# Patient Record
Sex: FEMALE | Race: WHITE | Employment: UNEMPLOYED | URBAN - METROPOLITAN AREA
[De-identification: names, ages, dates, MRNs, and addresses within clinical notes are randomized per-mention and may not be internally consistent; named-entity substitution may affect disease eponyms.]

---

## 2020-08-03 PROBLEM — M79.601 RIGHT ARM PAIN: Status: ACTIVE | Noted: 2019-11-11

## 2020-08-03 PROBLEM — M54.6 CHRONIC THORACIC BACK PAIN: Status: ACTIVE | Noted: 2017-03-23

## 2020-08-03 PROBLEM — G47.33 OSA (OBSTRUCTIVE SLEEP APNEA): Status: ACTIVE | Noted: 2018-01-26

## 2020-08-03 PROBLEM — G89.29 CHRONIC THORACIC BACK PAIN: Status: ACTIVE | Noted: 2017-03-23

## 2020-08-03 RX ORDER — LISINOPRIL 20 MG/1
30 TABLET ORAL DAILY
COMMUNITY
Start: 2020-07-27 | End: 2021-09-08

## 2020-08-04 ENCOUNTER — OFFICE VISIT (OUTPATIENT)
Dept: UROGYNECOLOGY | Age: 85
End: 2020-08-04
Payer: MEDICARE

## 2020-08-04 VITALS
TEMPERATURE: 97.1 F | RESPIRATION RATE: 16 BRPM | DIASTOLIC BLOOD PRESSURE: 67 MMHG | HEART RATE: 65 BPM | SYSTOLIC BLOOD PRESSURE: 155 MMHG | OXYGEN SATURATION: 97 %

## 2020-08-04 PROCEDURE — 1090F PRES/ABSN URINE INCON ASSESS: CPT | Performed by: OBSTETRICS & GYNECOLOGY

## 2020-08-04 PROCEDURE — 1123F ACP DISCUSS/DSCN MKR DOCD: CPT | Performed by: OBSTETRICS & GYNECOLOGY

## 2020-08-04 PROCEDURE — 57150 TREAT VAGINA INFECTION: CPT | Performed by: OBSTETRICS & GYNECOLOGY

## 2020-08-04 PROCEDURE — G8428 CUR MEDS NOT DOCUMENT: HCPCS | Performed by: OBSTETRICS & GYNECOLOGY

## 2020-08-04 PROCEDURE — 4004F PT TOBACCO SCREEN RCVD TLK: CPT | Performed by: OBSTETRICS & GYNECOLOGY

## 2020-08-04 PROCEDURE — 4040F PNEUMOC VAC/ADMIN/RCVD: CPT | Performed by: OBSTETRICS & GYNECOLOGY

## 2020-08-04 PROCEDURE — 99214 OFFICE O/P EST MOD 30 MIN: CPT | Performed by: OBSTETRICS & GYNECOLOGY

## 2020-08-04 PROCEDURE — G8421 BMI NOT CALCULATED: HCPCS | Performed by: OBSTETRICS & GYNECOLOGY

## 2020-08-04 PROCEDURE — G8400 PT W/DXA NO RESULTS DOC: HCPCS | Performed by: OBSTETRICS & GYNECOLOGY

## 2020-08-04 RX ORDER — ESTRADIOL 0.1 MG/G
CREAM VAGINAL
COMMUNITY
Start: 2019-07-30

## 2020-08-04 ASSESSMENT — ENCOUNTER SYMPTOMS: BACK PAIN: 1

## 2020-08-04 NOTE — PROGRESS NOTES
Date: 8/4/2020     HPI:     Name: Emily Sweet  YOB: 1935    CC: Emily Sweet returns for a pessary check today. HPI: She reports no bleeding, has discharge, no pain, no discomfot. She is not removing the device herself at home. Do you have vaginal discharge?: Yes  How long have you had this problem?:   Several years  Please rate the severity of your problem: moderately severe  Anything make it better? nothing   Ob/Gyn History:    OB History   No obstetric history on file. Past Medical History:   Past Medical History:   Diagnosis Date    Arthritis of both knees     [facility] ([facility])     Cellulitis     Chest pain     Chronic midline thoracic back pain     Compression fracture of thoracic vertebra, closed, initial encounter ([facility])     Heart palpitations     HTN (hypertension)     Hyperglyceridemia     Hyperlipidemia     Left cataract     Leukorrhea     Macular degeneration     Midline cystocele     Midline low back pain without sciatica     MICHI (obstructive sleep apnea)     Osteoporosis     Raynaud phenomenon     Snoring     Subacute on chronic vaginitis     Vaginal atrophy     Vaginal discharge     Vulvovaginitis      Past Surgical History:   Past Surgical History:   Procedure Laterality Date    APPENDECTOMY      BUNIONECTOMY      EYE SURGERY      TONSILLECTOMY      VARICOSE VEIN SURGERY      WISDOM TOOTH EXTRACTION       Current Medications:  Current Outpatient Medications   Medication Sig Dispense Refill    estradiol (ESTRACE) 0.1 MG/GM vaginal cream Apply small amount to vagina every other night with finger      lisinopril (PRINIVIL;ZESTRIL) 20 MG tablet Take 30 mg by mouth daily       No current facility-administered medications for this visit. Allergies:    Allergies   Allergen Reactions    Penicillins Other (See Comments)     Social History:   Social History     Socioeconomic History    Marital status:      Spouse name: Not on file Pulmonary:      Effort: Pulmonary effort is normal.   Abdominal:      General: Abdomen is flat. Palpations: Abdomen is soft. Genitourinary:     Exam position: Lithotomy position. Comments: Vaginal discharge  Skin:     General: Skin is warm and dry. Neurological:      Mental Status: She is alert and oriented to person, place, and time. Psychiatric:         Mood and Affect: Mood normal.       External genitalia: normal, no lesions  Vulva: no lesions  Urethra: no caruncle    The ring with support pessary was removed and cleaned. The entire length of the vagina including the vaginal walls were irrigated with H2O2 coated swabs. Patient tolerated procedure well. The pessary was replaced. Assessment/Plan:     Abril Ryder is a 80 y.o. female with   1. Vaginal discharge    2. Vaginal atrophy    3. Cystocele, midline      The patient is to follow up in 3 months for evaluation. She was counseled on symptoms associated with ulceration or malposition of the device. She was asked to call or return sooner for abnormal discharge, bleeding, spotting, or other concerns. Orders Placed This Encounter   Procedures    IN TREAT VAGINA INFECTION     No orders of the defined types were placed in this encounter. RAYMOND Ventura Going am scribing for and in the presence and direction of Dr. Kaleb Bethea. 8/4/2020   Angela Quinonez LPN  I, Dr. Kaleb Bethea, personally performed the services described in this documentation as scribed by the clinical staff in my presence, and it is both accurate and complete.

## 2020-12-01 ENCOUNTER — OFFICE VISIT (OUTPATIENT)
Dept: UROGYNECOLOGY | Age: 85
End: 2020-12-01
Payer: MEDICARE

## 2020-12-01 VITALS
SYSTOLIC BLOOD PRESSURE: 158 MMHG | HEART RATE: 55 BPM | OXYGEN SATURATION: 98 % | RESPIRATION RATE: 16 BRPM | TEMPERATURE: 97 F | DIASTOLIC BLOOD PRESSURE: 67 MMHG

## 2020-12-01 PROCEDURE — G8421 BMI NOT CALCULATED: HCPCS | Performed by: OBSTETRICS & GYNECOLOGY

## 2020-12-01 PROCEDURE — 1123F ACP DISCUSS/DSCN MKR DOCD: CPT | Performed by: OBSTETRICS & GYNECOLOGY

## 2020-12-01 PROCEDURE — 99214 OFFICE O/P EST MOD 30 MIN: CPT | Performed by: OBSTETRICS & GYNECOLOGY

## 2020-12-01 PROCEDURE — 57150 TREAT VAGINA INFECTION: CPT | Performed by: OBSTETRICS & GYNECOLOGY

## 2020-12-01 PROCEDURE — G8484 FLU IMMUNIZE NO ADMIN: HCPCS | Performed by: OBSTETRICS & GYNECOLOGY

## 2020-12-01 PROCEDURE — G8400 PT W/DXA NO RESULTS DOC: HCPCS | Performed by: OBSTETRICS & GYNECOLOGY

## 2020-12-01 PROCEDURE — 4040F PNEUMOC VAC/ADMIN/RCVD: CPT | Performed by: OBSTETRICS & GYNECOLOGY

## 2020-12-01 PROCEDURE — G8427 DOCREV CUR MEDS BY ELIG CLIN: HCPCS | Performed by: OBSTETRICS & GYNECOLOGY

## 2020-12-01 PROCEDURE — 1036F TOBACCO NON-USER: CPT | Performed by: OBSTETRICS & GYNECOLOGY

## 2020-12-01 PROCEDURE — 1090F PRES/ABSN URINE INCON ASSESS: CPT | Performed by: OBSTETRICS & GYNECOLOGY

## 2020-12-01 RX ORDER — ATORVASTATIN CALCIUM 40 MG/1
TABLET, FILM COATED ORAL
COMMUNITY
Start: 2020-09-23

## 2020-12-01 ASSESSMENT — ENCOUNTER SYMPTOMS: APNEA: 1

## 2020-12-01 NOTE — PROGRESS NOTES
Date: 12/1/2020     HPI:     Name: Katherine Oconnor  YOB: 1935    CC: Katherine Oconnor returns for a pessary check today. HPI: She reports no bleeding, has discharge, no pain, no discomfot. She is not removing the device herself at home. Do you have vaginal discharge?: Yes  How long have you had this problem?:   Since having pessary  Please rate the severity of your problem: very severe  Anything make it better? nothing     Ob/Gyn History:    OB History   No obstetric history on file. Past Medical History:   Past Medical History:   Diagnosis Date    Arthritis of both knees     Atrial tachycardia ([facility])     Cellulitis     Chest pain     Chronic midline thoracic back pain     Compression fracture of thoracic vertebra, closed, initial encounter ([facility])     Heart palpitations     HTN (hypertension)     Hyperglyceridemia     Hyperlipidemia     Left cataract     Leukorrhea     Macular degeneration     Midline cystocele     Midline low back pain without sciatica     MICHI (obstructive sleep apnea)     Osteoporosis     Raynaud phenomenon     Snoring     Subacute on chronic vaginitis     Vaginal atrophy     Vaginal discharge     Vulvovaginitis      Past Surgical History:   Past Surgical History:   Procedure Laterality Date    APPENDECTOMY      BUNIONECTOMY      EYE SURGERY      TONSILLECTOMY      VARICOSE VEIN SURGERY      WISDOM TOOTH EXTRACTION       Current Medications:  Current Outpatient Medications   Medication Sig Dispense Refill    atorvastatin (LIPITOR) 40 MG tablet       estradiol (ESTRACE) 0.1 MG/GM vaginal cream Apply small amount to vagina every other night with finger      lisinopril (PRINIVIL;ZESTRIL) 20 MG tablet Take 30 mg by mouth daily       No current facility-administered medications for this visit. Allergies:    Allergies   Allergen Reactions    Penicillins Other (See Comments)     Social History:   Social History     Socioeconomic History    Marital status:      Spouse name: Not on file    Number of children: Not on file    Years of education: Not on file    Highest education level: Not on file   Occupational History    Not on file   Social Needs    Financial resource strain: Not on file    Food insecurity     Worry: Not on file     Inability: Not on file    Transportation needs     Medical: Not on file     Non-medical: Not on file   Tobacco Use    Smoking status: Former Smoker    Smokeless tobacco: Never Used   Substance and Sexual Activity    Alcohol use: Yes     Comment: socially    Drug use: Never    Sexual activity: Not Currently     Comment:    Lifestyle    Physical activity     Days per week: Not on file     Minutes per session: Not on file    Stress: Not on file   Relationships    Social connections     Talks on phone: Not on file     Gets together: Not on file     Attends Mandaeism service: Not on file     Active member of club or organization: Not on file     Attends meetings of clubs or organizations: Not on file     Relationship status: Not on file    Intimate partner violence     Fear of current or ex partner: Not on file     Emotionally abused: Not on file     Physically abused: Not on file     Forced sexual activity: Not on file   Other Topics Concern    Not on file   Social History Narrative    Not on file     Family History:   Family History   Problem Relation Age of Onset    Cancer Mother     Heart Attack Father     Heart Failure Father     Parkinsonism Sister     Cancer Maternal Grandmother         gastric    Mult Sclerosis Maternal Grandfather     Stroke Paternal Grandfather     Hypertension Son      Review of System  Review of Systems   Respiratory: Positive for apnea. Genitourinary: Positive for vaginal bleeding. All other systems reviewed and are negative. A review of systems was done by the patient and reviewed by me and scanned into media today.     Objective:     Vital Signs  Vitals:    12/01/20 0959   BP: (!) 158/67   Pulse: 55   Resp: 16   Temp: 97 °F (36.1 °C)   SpO2: 98%     Physical Exam  External genitalia: normal, no lesions  Vulva: no lesions  Urethra: no caruncle    The ring with support pessary was removed and cleaned. The entire length of the vagina including the vaginal walls were irrigated with H2O2 coated swabs. Patient tolerated procedure well. The pessary was replaced. No results found for this visit on 12/01/20. Assessment/Plan:     Arleen García is a 80 y.o. female with   1. Vaginal discharge    2. Vaginal atrophy    3. Cystocele, midline      The patient is to follow up in 3 months for evaluation. She was counseled on symptoms associated with ulceration or malposition of the device. She was asked to call or return sooner for abnormal discharge, bleeding, spotting, or other concerns. Orders Placed This Encounter   Procedures    NE TREAT VAGINA INFECTION     No orders of the defined types were placed in this encounter.        Sveta White

## 2021-03-02 ENCOUNTER — OFFICE VISIT (OUTPATIENT)
Dept: UROGYNECOLOGY | Age: 86
End: 2021-03-02
Payer: MEDICARE

## 2021-03-02 VITALS
DIASTOLIC BLOOD PRESSURE: 79 MMHG | RESPIRATION RATE: 20 BRPM | SYSTOLIC BLOOD PRESSURE: 176 MMHG | OXYGEN SATURATION: 97 % | TEMPERATURE: 96.8 F | HEART RATE: 62 BPM

## 2021-03-02 DIAGNOSIS — N81.11 CYSTOCELE, MIDLINE: ICD-10-CM

## 2021-03-02 DIAGNOSIS — N89.8 VAGINAL DISCHARGE: Primary | ICD-10-CM

## 2021-03-02 DIAGNOSIS — N95.2 VAGINAL ATROPHY: ICD-10-CM

## 2021-03-02 PROCEDURE — 4040F PNEUMOC VAC/ADMIN/RCVD: CPT | Performed by: OBSTETRICS & GYNECOLOGY

## 2021-03-02 PROCEDURE — 99214 OFFICE O/P EST MOD 30 MIN: CPT | Performed by: OBSTETRICS & GYNECOLOGY

## 2021-03-02 PROCEDURE — 1123F ACP DISCUSS/DSCN MKR DOCD: CPT | Performed by: OBSTETRICS & GYNECOLOGY

## 2021-03-02 PROCEDURE — G8400 PT W/DXA NO RESULTS DOC: HCPCS | Performed by: OBSTETRICS & GYNECOLOGY

## 2021-03-02 PROCEDURE — G8484 FLU IMMUNIZE NO ADMIN: HCPCS | Performed by: OBSTETRICS & GYNECOLOGY

## 2021-03-02 PROCEDURE — 1090F PRES/ABSN URINE INCON ASSESS: CPT | Performed by: OBSTETRICS & GYNECOLOGY

## 2021-03-02 PROCEDURE — G8427 DOCREV CUR MEDS BY ELIG CLIN: HCPCS | Performed by: OBSTETRICS & GYNECOLOGY

## 2021-03-02 PROCEDURE — 1036F TOBACCO NON-USER: CPT | Performed by: OBSTETRICS & GYNECOLOGY

## 2021-03-02 PROCEDURE — 57150 TREAT VAGINA INFECTION: CPT | Performed by: OBSTETRICS & GYNECOLOGY

## 2021-03-02 PROCEDURE — G8421 BMI NOT CALCULATED: HCPCS | Performed by: OBSTETRICS & GYNECOLOGY

## 2021-03-02 RX ORDER — EZETIMIBE AND SIMVASTATIN 10; 40 MG/1; MG/1
1 TABLET ORAL NIGHTLY
COMMUNITY

## 2021-03-02 RX ORDER — AMOXICILLIN 250 MG
1 CAPSULE ORAL NIGHTLY
COMMUNITY
Start: 2020-05-12 | End: 2021-06-08

## 2021-03-02 NOTE — LETTER
[address] [place] [address] Urogynecology  [facility] [address] [facility]  Phone: [phone]  Fax: [phone]    Mary Harley MD        March 2, 2021        Dear Dr. Ambar Jones,    I had the pleasure of seeing Christa Brandon in the office today. She is done very well with her pessary. Thank you for allowing me to participate in her care if I can be of any further assistance please not hesitate to let me know      Sincerely,    Moises Gallego MD   Date: 3/2/2021     HPI:     Name: Isaias Walker  YOB: 1935    CC: Isaias Walker returns for a pessary check today. HPI: She reports no bleeding, has discharge, no pain, no discomfot. She is not removing the device herself at home. Do you have vaginal discharge?: Yes  How long have you had this problem?:   Since pessary placement  Please rate the severity of your problem: mild  Anything make it better? Cleaning the pessary     Ob/Gyn History:    OB History   No obstetric history on file.       Past Medical History:   Past Medical History:   Diagnosis Date    Arthritis of both knees     Atrial tachycardia ([facility])     Cellulitis     Chest pain     Chronic midline thoracic back pain     Compression fracture [facility] thoracic vertebra[facility] closed[facility])     Heart palpitations     HTN (hypertension)     Hyperglyceridemia     Hyperlipidemia     Left cataract     Leukorrhea     Macular degeneration     Midline cystocele     Midline low back pain without sciatica     MICHI (obstructive sleep apnea)     Osteoporosis     Raynaud phenomenon     Snoring     Subacute on chronic vaginitis     Vaginal atrophy     Vaginal discharge     Vulvovaginitis      Past Surgical History:   Past Surgical History:   Procedure Laterality Date    APPENDECTOMY      BUNIONECTOMY      EYE SURGERY      TONSILLECTOMY      VARICOSE VEIN SURGERY      WISDOM TOOTH EXTRACTION       Current Medications: Current Outpatient Medications   Medication Sig Dispense Refill    senna-docusate (PERICOLACE) 8.6-50 MG per tablet Take 1 tablet by mouth nightly      ezetimibe-simvastatin (VYTORIN) 10-40 MG per tablet Take 1 tablet by mouth nightly      atorvastatin (LIPITOR) 40 MG tablet       estradiol (ESTRACE) 0.1 MG/GM vaginal cream Apply small amount to vagina every other night with finger      lisinopril (PRINIVIL;ZESTRIL) 20 MG tablet Take 30 mg by mouth daily       No current facility-administered medications for this visit. Allergies:    Allergies   Allergen Reactions    Penicillins Other (See Comments)     Social History:   Social History     Socioeconomic History    Marital status:      Spouse name: Not on file    Number of children: Not on file    Years of education: Not on file    Highest education level: Not on file   Occupational History    Not on file   Social Needs    Financial resource strain: Not on file    Food insecurity     Worry: Not on file     Inability: Not on file    Transportation needs     Medical: Not on file     Non-medical: Not on file   Tobacco Use    Smoking status: Former Smoker    Smokeless tobacco: Never Used   Substance and Sexual Activity    Alcohol use: Yes     Comment: socially    Drug use: Never    Sexual activity: Not Currently     Comment:    Lifestyle    Physical activity     Days per week: Not on file     Minutes per session: Not on file    Stress: Not on file   Relationships    Social connections     Talks on phone: Not on file     Gets together: Not on file     Attends Jainism service: Not on file     Active member of club or organization: Not on file     Attends meetings of clubs or organizations: Not on file     Relationship status: Not on file    Intimate partner violence     Fear of current or ex partner: Not on file     Emotionally abused: Not on file     Physically abused: Not on file     Forced sexual activity: Not on file  [place] TREAT VAGINA INFECTION     No orders of the defined types were placed in this encounter.        [facility]

## 2021-03-02 NOTE — PROGRESS NOTES
3/2/2021       HPI:     Name: Jericho Hopkins  YOB: 1935    CC: Patient is a 80 y.o. presenting for evaluation of {UroGyn Problems:98725}. HPI: How long have you had this problem? ***  Please rate the severity of your problem: {CHP AMB MILD/MODERATE/MODERATELY SEVERE/SEVERE/VERY SEVERE:21020110}  Anything make it better? ***    Ob/Gyn History:    OB History   No obstetric history on file. Past Medical History:   Past Medical History:   Diagnosis Date    Arthritis of both knees     [facility] ([facility])     Cellulitis     Chest pain     Chronic midline thoracic back pain     Compression fracture of thoracic vertebra, closed, initial encounter ([facility])     Heart palpitations     HTN (hypertension)     Hyperglyceridemia     Hyperlipidemia     Left cataract     Leukorrhea     Macular degeneration     Midline cystocele     Midline low back pain without sciatica     MICHI (obstructive sleep apnea)     Osteoporosis     Raynaud phenomenon     Snoring     Subacute on chronic vaginitis     Vaginal atrophy     Vaginal discharge     Vulvovaginitis      Past Surgical History:   Past Surgical History:   Procedure Laterality Date    APPENDECTOMY      BUNIONECTOMY      EYE SURGERY      TONSILLECTOMY      VARICOSE VEIN SURGERY      WISDOM TOOTH EXTRACTION       Allergies: Allergies   Allergen Reactions    Penicillins Other (See Comments)     Current Medications:  Current Outpatient Medications   Medication Sig Dispense Refill    atorvastatin (LIPITOR) 40 MG tablet       estradiol (ESTRACE) 0.1 MG/GM vaginal cream Apply small amount to vagina every other night with finger      lisinopril (PRINIVIL;ZESTRIL) 20 MG tablet Take 30 mg by mouth daily       No current facility-administered medications for this visit.       Social History:   Social History     Socioeconomic History    Marital status:      Spouse name: Not on file    Number of children: Not on file  Years of education: Not on file    Highest education level: Not on file   Occupational History    Not on file   Social Needs    Financial resource strain: Not on file    Food insecurity     Worry: Not on file     Inability: Not on file    Transportation needs     Medical: Not on file     Non-medical: Not on file   Tobacco Use    Smoking status: Former Smoker    Smokeless tobacco: Never Used   Substance and Sexual Activity    Alcohol use: Yes     Comment: socially    Drug use: Never    Sexual activity: Not Currently     Comment:    Lifestyle    Physical activity     Days per week: Not on file     Minutes per session: Not on file    Stress: Not on file   Relationships    Social connections     Talks on phone: Not on file     Gets together: Not on file     Attends Religion service: Not on file     Active member of club or organization: Not on file     Attends meetings of clubs or organizations: Not on file     Relationship status: Not on file    Intimate partner violence     Fear of current or ex partner: Not on file     Emotionally abused: Not on file     Physically abused: Not on file     Forced sexual activity: Not on file   Other Topics Concern    Not on file   Social History Narrative    Not on file     Family History:   Family History   Problem Relation Age of Onset    Cancer Mother     Heart Attack Father     Heart Failure Father     Parkinsonism Sister     Cancer Maternal Grandmother         gastric    Mult Sclerosis Maternal Grandfather     Stroke Paternal Grandfather     Hypertension Son      Review of System   Review of Systems   Genitourinary: Positive for vaginal discharge. All other systems reviewed and are negative. A review of systems was done by the patient and reviewed by me and scanned into media today. Objective:     Vitals  There were no vitals filed for this visit. Physical Exam  Physical Exam    No results found for this visit on 03/02/21. Assessment/Plan:     Eduardo Ellis is a 80 y.o. female with No diagnosis found. No orders of the defined types were placed in this encounter. No orders of the defined types were placed in this encounter.       Nanci Rodgers

## 2021-03-02 NOTE — PROGRESS NOTES
Date: 3/2/2021     HPI:     Name: David Perez  YOB: 1935    CC: David Perez returns for a pessary check today. HPI: She reports no bleeding, has discharge, no pain, no discomfot. She is not removing the device herself at home. Do you have vaginal discharge?: Yes  How long have you had this problem?:   Since pessary placement  Please rate the severity of your problem: mild  Anything make it better? Cleaning the pessary     Ob/Gyn History:    OB History   No obstetric history on file.       Past Medical History:   Past Medical History:   Diagnosis Date    Arthritis of both knees     [facility] ([facility])     Cellulitis     Chest pain     Chronic midline thoracic back pain     Compression fracture of thoracic vertebra, closed, initial encounter ([facility])     Heart palpitations     HTN (hypertension)     Hyperglyceridemia     Hyperlipidemia     Left cataract     Leukorrhea     Macular degeneration     Midline cystocele     Midline low back pain without sciatica     MICHI (obstructive sleep apnea)     Osteoporosis     Raynaud phenomenon     Snoring     Subacute on chronic vaginitis     Vaginal atrophy     Vaginal discharge     Vulvovaginitis      Past Surgical History:   Past Surgical History:   Procedure Laterality Date    APPENDECTOMY      BUNIONECTOMY      EYE SURGERY      TONSILLECTOMY      VARICOSE VEIN SURGERY      WISDOM TOOTH EXTRACTION       Current Medications:  Current Outpatient Medications   Medication Sig Dispense Refill    senna-docusate (PERICOLACE) 8.6-50 MG per tablet Take 1 tablet by mouth nightly      ezetimibe-simvastatin (VYTORIN) 10-40 MG per tablet Take 1 tablet by mouth nightly      atorvastatin (LIPITOR) 40 MG tablet       estradiol (ESTRACE) 0.1 MG/GM vaginal cream Apply small amount to vagina every other night with finger      lisinopril (PRINIVIL;ZESTRIL) 20 MG tablet Take 30 mg by mouth daily       No current facility-administered medications for this visit. Allergies: Allergies   Allergen Reactions    Penicillins Other (See Comments)     Social History:   Social History     Socioeconomic History    Marital status:      Spouse name: Not on file    Number of children: Not on file    Years of education: Not on file    Highest education level: Not on file   Occupational History    Not on file   Social Needs    Financial resource strain: Not on file    Food insecurity     Worry: Not on file     Inability: Not on file    Transportation needs     Medical: Not on file     Non-medical: Not on file   Tobacco Use    Smoking status: Former Smoker    Smokeless tobacco: Never Used   Substance and Sexual Activity    Alcohol use: Yes     Comment: socially    Drug use: Never    Sexual activity: Not Currently     Comment:    Lifestyle    Physical activity     Days per week: Not on file     Minutes per session: Not on file    Stress: Not on file   Relationships    Social connections     Talks on phone: Not on file     Gets together: Not on file     Attends Pentecostalism service: Not on file     Active member of club or organization: Not on file     Attends meetings of clubs or organizations: Not on file     Relationship status: Not on file    Intimate partner violence     Fear of current or ex partner: Not on file     Emotionally abused: Not on file     Physically abused: Not on file     Forced sexual activity: Not on file   Other Topics Concern    Not on file   Social History Narrative    Not on file     Family History:   Family History   Problem Relation Age of Onset    Cancer Mother     Heart Attack Father     Heart Failure Father     Parkinsonism Sister     Cancer Maternal Grandmother         gastric    Mult Sclerosis Maternal Grandfather     Stroke Paternal Grandfather     Hypertension Son      Review of System  Review of Systems   Genitourinary: Positive for vaginal discharge.        A review of systems was done by the patient and reviewed by me and scanned into media today. Objective:     Vital Signs  Vitals:    03/02/21 0926   BP: (!) 176/79   Pulse: 62   Resp: 20   Temp: 96.8 °F (36 °C)   SpO2: 97%     Physical Exam  HENT:      Head: Normocephalic and atraumatic. Eyes:      Conjunctiva/sclera: Conjunctivae normal.   Neck:      Musculoskeletal: Normal range of motion and neck supple. Pulmonary:      Effort: Pulmonary effort is normal.   Abdominal:      Palpations: Abdomen is soft. Skin:     General: Skin is warm and dry. Neurological:      Mental Status: She is alert and oriented to person, place, and time. External genitalia: normal, no lesions  Vulva: no lesions  Urethra: no caruncle    The ring with support pessary was removed and cleaned. The entire length of the vagina including the vaginal walls were irrigated with H2O2 coated swabs. Patient tolerated procedure well. The pessary was replaced. No results found for this visit on 03/02/21. Assessment/Plan:     Theodora Lee is a 80 y.o. female with   1. Vaginal discharge    2. Vaginal atrophy    3. Cystocele, midline      The patient is to follow up in 3 months for evaluation. She was counseled on symptoms associated with ulceration or malposition of the device. She was asked to call or return sooner for abnormal discharge, bleeding, spotting, or other concerns. Orders Placed This Encounter   Procedures    [place] TREAT VAGINA INFECTION     No orders of the defined types were placed in this encounter.        Tony Berry

## 2021-06-08 ENCOUNTER — OFFICE VISIT (OUTPATIENT)
Dept: UROGYNECOLOGY | Age: 86
End: 2021-06-08
Payer: MEDICARE

## 2021-06-08 VITALS
HEART RATE: 62 BPM | OXYGEN SATURATION: 98 % | DIASTOLIC BLOOD PRESSURE: 66 MMHG | TEMPERATURE: 97.5 F | SYSTOLIC BLOOD PRESSURE: 151 MMHG | RESPIRATION RATE: 16 BRPM

## 2021-06-08 DIAGNOSIS — N81.11 CYSTOCELE, MIDLINE: ICD-10-CM

## 2021-06-08 DIAGNOSIS — N95.2 VAGINAL ATROPHY: ICD-10-CM

## 2021-06-08 DIAGNOSIS — N89.8 VAGINAL DISCHARGE: Primary | ICD-10-CM

## 2021-06-08 PROCEDURE — 1123F ACP DISCUSS/DSCN MKR DOCD: CPT | Performed by: OBSTETRICS & GYNECOLOGY

## 2021-06-08 PROCEDURE — 4040F PNEUMOC VAC/ADMIN/RCVD: CPT | Performed by: OBSTETRICS & GYNECOLOGY

## 2021-06-08 PROCEDURE — G8421 BMI NOT CALCULATED: HCPCS | Performed by: OBSTETRICS & GYNECOLOGY

## 2021-06-08 PROCEDURE — G8400 PT W/DXA NO RESULTS DOC: HCPCS | Performed by: OBSTETRICS & GYNECOLOGY

## 2021-06-08 PROCEDURE — 99214 OFFICE O/P EST MOD 30 MIN: CPT | Performed by: OBSTETRICS & GYNECOLOGY

## 2021-06-08 PROCEDURE — G8427 DOCREV CUR MEDS BY ELIG CLIN: HCPCS | Performed by: OBSTETRICS & GYNECOLOGY

## 2021-06-08 PROCEDURE — 1090F PRES/ABSN URINE INCON ASSESS: CPT | Performed by: OBSTETRICS & GYNECOLOGY

## 2021-06-08 PROCEDURE — 1036F TOBACCO NON-USER: CPT | Performed by: OBSTETRICS & GYNECOLOGY

## 2021-06-08 PROCEDURE — 17250 CHEM CAUT OF GRANLTJ TISSUE: CPT | Performed by: OBSTETRICS & GYNECOLOGY

## 2021-06-08 NOTE — PROGRESS NOTES
Date: 6/8/2021     HPI:     Name: Annia Marion  YOB: 1935    CC: Annia Marion returns for a pessary check today. HPI: She reports no bleeding, has discharge, no pain, no discomfot. She is not removing the device herself at home. Do you have vaginal discharge?: Yes  How long have you had this problem?:   Since pessary  Please rate the severity of your problem: mild  Anything make it better? pessary     Ob/Gyn History:    OB History   No obstetric history on file. Past Medical History:   Past Medical History:   Diagnosis Date    Arthritis of both knees     [facility] ([facility])     Cellulitis     Chest pain     Chronic midline thoracic back pain     Compression fracture of thoracic vertebra, closed, initial encounter ([facility])     Heart palpitations     HTN (hypertension)     Hyperglyceridemia     Hyperlipidemia     Left cataract     Leukorrhea     Macular degeneration     Midline cystocele     Midline low back pain without sciatica     MICHI (obstructive sleep apnea)     Osteoporosis     Raynaud phenomenon     Snoring     Subacute on chronic vaginitis     Vaginal atrophy     Vaginal discharge     Vulvovaginitis      Past Surgical History:   Past Surgical History:   Procedure Laterality Date    APPENDECTOMY      BUNIONECTOMY      EYE SURGERY      TONSILLECTOMY      VARICOSE VEIN SURGERY      WISDOM TOOTH EXTRACTION       Current Medications:  Current Outpatient Medications   Medication Sig Dispense Refill    ezetimibe-simvastatin (VYTORIN) 10-40 MG per tablet Take 1 tablet by mouth nightly      atorvastatin (LIPITOR) 40 MG tablet       estradiol (ESTRACE) 0.1 MG/GM vaginal cream Apply small amount to vagina every other night with finger      lisinopril (PRINIVIL;ZESTRIL) 20 MG tablet Take 30 mg by mouth daily       No current facility-administered medications for this visit. Allergies:    Allergies   Allergen Reactions    Penicillins Other (See Comments) All other systems reviewed and are negative. A review of systems was done by the patient and reviewed by me and scanned into media today. Objective:     Vital Signs  Vitals:    06/08/21 0939   BP: (!) 151/66   Pulse: 62   Resp: 16   Temp: 97.5 °F (36.4 °C)   SpO2: 98%     Physical Exam  HENT:      Head: Normocephalic and atraumatic. Eyes:      Conjunctiva/sclera: Conjunctivae normal.   Pulmonary:      Effort: Pulmonary effort is normal.   Abdominal:      Palpations: Abdomen is soft. Genitourinary:     Comments: Small area of granulation at the apex of the vagina, silver nitrate applied  Musculoskeletal:      Cervical back: Normal range of motion and neck supple. Skin:     General: Skin is warm and dry. Neurological:      Mental Status: She is alert and oriented to person, place, and time. External genitalia: normal, no lesions  Vulva: no lesions  Urethra: no caruncle    The ring with support pessary was removed and cleaned. The entire length of the vagina including the vaginal walls were irrigated with H2O2 coated swabs. Patient tolerated procedure well. The pessary was replaced. Silver nitrate applied to small area of granulation    No results found for this visit on 06/08/21. Assessment/Plan:     Mary Ann Keller is a 80 y.o. female with   1. Vaginal discharge    2. Vaginal atrophy    3. Cystocele, midline      The patient is to follow up in 3 months for evaluation. She was counseled on symptoms associated with ulceration or malposition of the device. She was asked to call or return sooner for abnormal discharge, bleeding, spotting, or other concerns. I did apply silver nitrate at the apex of the vagina. No orders of the defined types were placed in this encounter. No orders of the defined types were placed in this encounter.        Clearance MD Dieudonne

## 2021-09-08 ENCOUNTER — OFFICE VISIT (OUTPATIENT)
Dept: UROGYNECOLOGY | Age: 86
End: 2021-09-08
Payer: MEDICARE

## 2021-09-08 VITALS
TEMPERATURE: 97 F | SYSTOLIC BLOOD PRESSURE: 152 MMHG | DIASTOLIC BLOOD PRESSURE: 69 MMHG | HEART RATE: 76 BPM | OXYGEN SATURATION: 97 % | RESPIRATION RATE: 18 BRPM

## 2021-09-08 DIAGNOSIS — N95.2 VAGINAL ATROPHY: ICD-10-CM

## 2021-09-08 DIAGNOSIS — N99.3 PROLAPSE OF VAGINAL VAULT AFTER HYSTERECTOMY: Primary | ICD-10-CM

## 2021-09-08 DIAGNOSIS — N89.8 VAGINAL DISCHARGE: ICD-10-CM

## 2021-09-08 DIAGNOSIS — N76.5 VAGINAL ULCERATION: ICD-10-CM

## 2021-09-08 PROCEDURE — 99213 OFFICE O/P EST LOW 20 MIN: CPT | Performed by: NURSE PRACTITIONER

## 2021-09-08 PROCEDURE — 57150 TREAT VAGINA INFECTION: CPT | Performed by: NURSE PRACTITIONER

## 2021-09-08 RX ORDER — DILTIAZEM HYDROCHLORIDE 240 MG/1
CAPSULE, COATED, EXTENDED RELEASE ORAL
COMMUNITY
Start: 2021-09-01

## 2021-09-08 RX ORDER — LISINOPRIL 40 MG/1
TABLET ORAL
COMMUNITY
Start: 2021-08-04

## 2021-09-08 RX ORDER — FUROSEMIDE 20 MG/1
20 TABLET ORAL DAILY
COMMUNITY
Start: 2021-04-26

## 2021-09-08 ASSESSMENT — ENCOUNTER SYMPTOMS: APNEA: 1

## 2021-09-08 NOTE — PROGRESS NOTES
Date: 9/8/2021     HPI:     Name: Evaristo Barry  YOB: 1935    CC: Evaristo Barry returns for a pessary check today. HPI: She reports no bleeding, no discharge, no pain, no discomfot. She is not removing the device herself at home. Do you have vaginal discharge?: Yes, slight. How long have you had this problem?:   Years  Please rate the severity of your problem: mild  Anything make it better? The pessary helps     Ob/Gyn History:    OB History   No obstetric history on file.       Past Medical History:   Past Medical History:   Diagnosis Date    Arthritis of both knees     [facility])     Cellulitis     Chest pain     Chronic midline thoracic back pain     Compression fracture of thoracic vertebra, closed, initial encounter ([facility])     Heart palpitations     HTN (hypertension)     Hyperglyceridemia     Hyperlipidemia     Left cataract     Leukorrhea     Macular degeneration     Midline cystocele     Midline low back pain without sciatica     MICHI (obstructive sleep apnea)     Osteoporosis     Raynaud phenomenon     Snoring     Subacute on chronic vaginitis     Vaginal atrophy     Vaginal discharge     Vulvovaginitis      Past Surgical History:   Past Surgical History:   Procedure Laterality Date    APPENDECTOMY      BUNIONECTOMY      EYE SURGERY      TONSILLECTOMY      VARICOSE VEIN SURGERY      WISDOM TOOTH EXTRACTION       Current Medications:  Current Outpatient Medications   Medication Sig Dispense Refill    lisinopril (PRINIVIL;ZESTRIL) 40 MG tablet       furosemide (LASIX) 20 MG tablet Take 20 mg by mouth daily      dilTIAZem (CARDIZEM CD) 240 MG extended release capsule       ezetimibe-simvastatin (VYTORIN) 10-40 MG per tablet Take 1 tablet by mouth nightly      atorvastatin (LIPITOR) 40 MG tablet       estradiol (ESTRACE) 0.1 MG/GM vaginal cream Apply small amount to vagina every other night with finger       No current facility-administered medications for this visit. Allergies: Allergies   Allergen Reactions    Penicillins Other (See Comments)     Social History:   Social History     Socioeconomic History    Marital status:      Spouse name: Not on file    Number of children: Not on file    Years of education: Not on file    Highest education level: Not on file   Occupational History    Not on file   Tobacco Use    Smoking status: Former Smoker    Smokeless tobacco: Never Used   Vaping Use    Vaping Use: Never used   Substance and Sexual Activity    Alcohol use: Yes     Comment: socially    Drug use: Never    Sexual activity: Not Currently     Comment:    Other Topics Concern    Not on file   Social History Narrative    Not on file     Social Determinants of Health     Financial Resource Strain:     Difficulty of Paying Living Expenses:    Food Insecurity:     Worried About [address] in the Last Year:     [address] in the Last Year:    Transportation Needs:     Lack of Transportation (Medical):      Lack of Transportation (Non-Medical):    Physical Activity:     Days of Exercise per Week:     Minutes of Exercise per Session:    Stress:     Feeling of Stress :    Social Connections:     Frequency of Communication with Friends and Family:     Frequency of Social Gatherings with Friends and Family:     Attends Congregation Services:     Active Member of Clubs or Organizations:     Attends Club or Organization Meetings:     Marital Status:    Intimate Partner Violence:     Fear of Current or Ex-Partner:     Emotionally Abused:     Physically Abused:     Sexually Abused:      Family History:   Family History   Problem Relation Age of Onset    Cancer Mother     Heart Attack Father     Heart Failure Father     Parkinsonism Sister     Cancer Maternal Grandmother         gastric    Mult Sclerosis Maternal Grandfather     Stroke Paternal Grandfather     Hypertension Son      Review of System  Review of Systems   Eyes: Positive for visual disturbance. Respiratory: Positive for apnea. Genitourinary: Positive for vaginal discharge. All other systems reviewed and are negative. A review of systems was done by the patient and reviewed by me and scanned into media today. Objective:     Vital Signs  Vitals:    09/08/21 0937   BP: (!) 152/69   Pulse: 76   Resp: 18   Temp: 97 °F (36.1 °C)   SpO2: 97%     Physical Exam  Vitals and nursing note reviewed. Constitutional:       Appearance: Normal appearance. HENT:      Head: Normocephalic. Eyes:      Conjunctiva/sclera: Conjunctivae normal.   Cardiovascular:      Rate and Rhythm: Normal rate. Pulmonary:      Effort: Pulmonary effort is normal.   Genitourinary:     Vagina: Vaginal discharge present. Musculoskeletal:         General: Normal range of motion. Cervical back: Normal range of motion. Skin:     General: Skin is warm and dry. Neurological:      General: No focal deficit present. Mental Status: She is alert and oriented to person, place, and time. Psychiatric:         Mood and Affect: Mood normal.         Behavior: Behavior normal.         Thought Content: Thought content normal.       External genitalia: normal, no lesions  Vulva: no lesions  Urethra: no caruncle  Vagina:  Dime size ulceration noted at apex. The ring with support pessary was removed and cleaned. The entire length of the vagina including the vaginal walls were irrigated with H2O2 coated swabs. Patient tolerated procedure well. The pessary was not replaced. No results found for this visit on 09/08/21. Assessment/Plan:     Best Patel is a 80 y.o. female with   1. Prolapse of vaginal vault after hysterectomy    2. Vaginal discharge    3. Vaginal atrophy    4. Vaginal ulceration    -Vaginal ulceration:  Vaginal irrigation completed and Pessary was not replaced.   The patient is to follow up in 5 weeks for evaluation and possible

## 2021-09-14 ENCOUNTER — TELEPHONE (OUTPATIENT)
Dept: UROGYNECOLOGY | Age: 86
End: 2021-09-14

## 2021-09-14 RX ORDER — CONJUGATED ESTROGENS 0.62 MG/G
CREAM VAGINAL
Qty: 42.5 G | Refills: 3 | Status: SHIPPED | OUTPATIENT
Start: 2021-09-14 | End: 2022-07-19

## 2021-09-14 NOTE — TELEPHONE ENCOUNTER
Patient saw Casey Suazo last week and wants her to use a vaginal cream, but patient needs a prescription.     [facility]

## 2021-10-14 ENCOUNTER — OFFICE VISIT (OUTPATIENT)
Dept: UROGYNECOLOGY | Age: 86
End: 2021-10-14
Payer: MEDICARE

## 2021-10-14 VITALS
SYSTOLIC BLOOD PRESSURE: 134 MMHG | DIASTOLIC BLOOD PRESSURE: 51 MMHG | RESPIRATION RATE: 14 BRPM | TEMPERATURE: 98 F | HEART RATE: 76 BPM | OXYGEN SATURATION: 98 %

## 2021-10-14 DIAGNOSIS — N76.5 VAGINAL ULCERATION: ICD-10-CM

## 2021-10-14 DIAGNOSIS — N95.2 VAGINAL ATROPHY: ICD-10-CM

## 2021-10-14 DIAGNOSIS — N99.3 PROLAPSE OF VAGINAL VAULT AFTER HYSTERECTOMY: Primary | ICD-10-CM

## 2021-10-14 PROCEDURE — 99213 OFFICE O/P EST LOW 20 MIN: CPT | Performed by: NURSE PRACTITIONER

## 2021-10-14 ASSESSMENT — ENCOUNTER SYMPTOMS: APNEA: 1

## 2021-10-14 NOTE — PROGRESS NOTES
Date: 10/14/2021     HPI:     Name: Cam Deshpande  YOB: 1935    CC: Cam Deshpande returns for a pessary check today. HPI:  Had a \"dime sized\" ulcer at the apex of vagina at last visit. She does not have the pessary in today. Do you have vaginal discharge?: No  How long have you had this problem?:     Please rate the severity of your problem: mild  Anything make it better? Reports no bleeding or abnormal discharge       Ob/Gyn History:    OB History   No obstetric history on file. Past Medical History:   Past Medical History:   Diagnosis Date    Arthritis of both knees     [facility] tachycardia ([facility])     Cellulitis     Chest pain     Chronic midline thoracic back pain     Compression fracture of thoracic vertebra, closed, initial encounter ([facility])     Heart palpitations     HTN (hypertension)     Hyperglyceridemia     Hyperlipidemia     Left cataract     Leukorrhea     Macular degeneration     Midline cystocele     Midline low back pain without sciatica     MICHI (obstructive sleep apnea)     Osteoporosis     Raynaud phenomenon     Snoring     Subacute on chronic vaginitis     Vaginal atrophy     Vaginal discharge     Vulvovaginitis      Past Surgical History:   Past Surgical History:   Procedure Laterality Date    APPENDECTOMY      BUNIONECTOMY      EYE SURGERY      TONSILLECTOMY      VARICOSE VEIN SURGERY      WISDOM TOOTH EXTRACTION       Current Medications:  Current Outpatient Medications   Medication Sig Dispense Refill    conjugated estrogens (PREMARIN) 0.625 MG/GM vaginal cream Apply pea size manually to the vagina. 2 times per week.  42.5 g 3    lisinopril (PRINIVIL;ZESTRIL) 40 MG tablet       furosemide (LASIX) 20 MG tablet Take 20 mg by mouth daily      dilTIAZem (CARDIZEM CD) 240 MG extended release capsule       ezetimibe-simvastatin (VYTORIN) 10-40 MG per tablet Take 1 tablet by mouth nightly      atorvastatin (LIPITOR) 40 MG tablet       estradiol (ESTRACE) 0.1 MG/GM vaginal cream Apply small amount to vagina every other night with finger       No current facility-administered medications for this visit. Allergies: Allergies   Allergen Reactions    Penicillins Other (See Comments)     Social History:   Social History     Socioeconomic History    Marital status:      Spouse name: Not on file    Number of children: Not on file    Years of education: Not on file    Highest education level: Not on file   Occupational History    Not on file   Tobacco Use    Smoking status: Former Smoker    Smokeless tobacco: Never Used   Vaping Use    Vaping Use: Never used   Substance and Sexual Activity    Alcohol use: Yes     Comment: socially    Drug use: Never    Sexual activity: Not Currently     Comment:    Other Topics Concern    Not on file   Social History Narrative    Not on file     Social Determinants of Health     Financial Resource Strain:     Difficulty of Paying Living Expenses:    Food Insecurity:     Worried About [address] in the Last Year:     [address] [employer] in the Last Year:    Transportation Needs:     Lack of Transportation (Medical):      Lack of Transportation (Non-Medical):    Physical Activity:     Days of Exercise per Week:     Minutes of Exercise per Session:    Stress:     Feeling of Stress :    Social Connections:     Frequency of Communication with Friends and Family:     Frequency of Social Gatherings with Friends and Family:     Attends [employer] Services:     Active Member of Clubs or Organizations:     Attends Club or Organization Meetings:     Marital Status:    Intimate Partner Violence:     Fear of Current or Ex-Partner:     Emotionally Abused:     Physically Abused:     Sexually Abused:      Family History:   Family History   Problem Relation Age of Onset    Cancer Mother     Heart Attack Father     Heart Failure Father     Parkinsonism Sister     Cancer Maternal Grandmother         gastric    Mult Sclerosis Maternal Grandfather     Stroke Paternal Grandfather     Hypertension Son      Review of System  Review of Systems   Respiratory: Positive for apnea. Genitourinary: Positive for vaginal discharge. All other systems reviewed and are negative. A review of systems was done by the patient and reviewed by me and scanned into media today. Objective:     Vital Signs  Vitals:    10/14/21 0940   BP: (!) 134/51   Pulse: 76   Resp: 14   Temp: 98 °F (36.7 °C)   SpO2: 98%     Physical Exam  Vitals and nursing note reviewed. Constitutional:       Appearance: Normal appearance. HENT:      Head: Normocephalic. Eyes:      Conjunctiva/sclera: Conjunctivae normal.   Cardiovascular:      Rate and Rhythm: Normal rate. Pulmonary:      Effort: Pulmonary effort is normal.   Musculoskeletal:         General: Normal range of motion. Cervical back: Normal range of motion. Skin:     General: Skin is warm and dry. Neurological:      General: No focal deficit present. Mental Status: She is alert and oriented to person, place, and time. Psychiatric:         Mood and Affect: Mood normal.         Behavior: Behavior normal.         Thought Content: Thought content normal.       External genitalia: normal, no lesions  Vulva: no lesions  Urethra: no caruncle    Vagina inspected, no further ulceration. The pessary was replaced. No results found for this visit on 10/14/21. Assessment/Plan:     Elen Bolivar is a 80 y.o. female with   1. Prolapse of vaginal vault after hysterectomy    2. Vaginal ulceration    3. Vaginal atrophy    Vaginal ulcer: resolved. Prolapse:  Pessary was reinserted  The patient is to follow up in 2 months for evaluation. She was counseled on symptoms associated with ulceration or malposition of the device. She was asked to call or return sooner for abnormal discharge, bleeding, spotting, or other concerns.          Vaginal atrophy: She will continue her estrogen cream every other night. CANDIDO Norton CNP       No orders of the defined types were placed in this encounter. No orders of the defined types were placed in this encounter.        CANDIDO Norton CNP

## 2021-12-14 ENCOUNTER — OFFICE VISIT (OUTPATIENT)
Dept: UROGYNECOLOGY | Age: 86
End: 2021-12-14
Payer: MEDICARE

## 2021-12-14 VITALS
DIASTOLIC BLOOD PRESSURE: 65 MMHG | HEART RATE: 98 BPM | TEMPERATURE: 98.2 F | RESPIRATION RATE: 14 BRPM | SYSTOLIC BLOOD PRESSURE: 142 MMHG | OXYGEN SATURATION: 98 %

## 2021-12-14 DIAGNOSIS — N99.3 PROLAPSE OF VAGINAL VAULT AFTER HYSTERECTOMY: Primary | ICD-10-CM

## 2021-12-14 DIAGNOSIS — N89.8 VAGINAL DISCHARGE: ICD-10-CM

## 2021-12-14 DIAGNOSIS — N95.2 VAGINAL ATROPHY: ICD-10-CM

## 2021-12-14 PROCEDURE — G8421 BMI NOT CALCULATED: HCPCS | Performed by: NURSE PRACTITIONER

## 2021-12-14 PROCEDURE — 1090F PRES/ABSN URINE INCON ASSESS: CPT | Performed by: NURSE PRACTITIONER

## 2021-12-14 PROCEDURE — 4040F PNEUMOC VAC/ADMIN/RCVD: CPT | Performed by: NURSE PRACTITIONER

## 2021-12-14 PROCEDURE — 57150 TREAT VAGINA INFECTION: CPT | Performed by: NURSE PRACTITIONER

## 2021-12-14 PROCEDURE — G8427 DOCREV CUR MEDS BY ELIG CLIN: HCPCS | Performed by: NURSE PRACTITIONER

## 2021-12-14 PROCEDURE — 1123F ACP DISCUSS/DSCN MKR DOCD: CPT | Performed by: NURSE PRACTITIONER

## 2021-12-14 PROCEDURE — 1036F TOBACCO NON-USER: CPT | Performed by: NURSE PRACTITIONER

## 2021-12-14 PROCEDURE — G8484 FLU IMMUNIZE NO ADMIN: HCPCS | Performed by: NURSE PRACTITIONER

## 2021-12-14 PROCEDURE — 99213 OFFICE O/P EST LOW 20 MIN: CPT | Performed by: NURSE PRACTITIONER

## 2021-12-14 ASSESSMENT — ENCOUNTER SYMPTOMS
BACK PAIN: 1
APNEA: 1

## 2021-12-14 NOTE — PROGRESS NOTES
Date: 12/14/2021     HPI:     Name: Edita Raymundo  YOB: 1935    CC: Edita Raymundo returns for a pessary check today. HPI: She reports no bleeding, no discharge, no pain, no discomfot. She is not removing the device herself at home. Do you have vaginal discharge?: Yes  How long have you had this problem?:     Please rate the severity of your problem: mild  Anything make it better? pessary         Ob/Gyn History:    OB History   No obstetric history on file. Past Medical History:   Past Medical History:   Diagnosis Date    Arthritis of both knees     [facility] ([facility])     Cellulitis     Chest pain     Chronic midline thoracic back pain     Compression fracture of thoracic vertebra, closed, initial encounter ([facility])     Heart palpitations     HTN (hypertension)     Hyperglyceridemia     Hyperlipidemia     Left cataract     Leukorrhea     Macular degeneration     Midline cystocele     Midline low back pain without sciatica     MICHI (obstructive sleep apnea)     Osteoporosis     Raynaud phenomenon     Snoring     Subacute on chronic vaginitis     Vaginal atrophy     Vaginal discharge     Vulvovaginitis      Past Surgical History:   Past Surgical History:   Procedure Laterality Date    APPENDECTOMY      BUNIONECTOMY      EYE SURGERY      TONSILLECTOMY      VARICOSE VEIN SURGERY      WISDOM TOOTH EXTRACTION       Current Medications:  Current Outpatient Medications   Medication Sig Dispense Refill    conjugated estrogens (PREMARIN) 0.625 MG/GM vaginal cream Apply pea size manually to the vagina. 2 times per week.  42.5 g 3    lisinopril (PRINIVIL;ZESTRIL) 40 MG tablet       furosemide (LASIX) 20 MG tablet Take 20 mg by mouth daily      dilTIAZem (CARDIZEM CD) 240 MG extended release capsule       ezetimibe-simvastatin (VYTORIN) 10-40 MG per tablet Take 1 tablet by mouth nightly      atorvastatin (LIPITOR) 40 MG tablet       estradiol (ESTRACE) 0.1 MG/GM vaginal cream Apply small amount to vagina every other night with finger       No current facility-administered medications for this visit. Allergies: Allergies   Allergen Reactions    Penicillins Other (See Comments)     Social History:   Social History     Socioeconomic History    Marital status:      Spouse name: Not on file    Number of children: Not on file    Years of education: Not on file    Highest education level: Not on file   Occupational History    Not on file   Tobacco Use    Smoking status: Former Smoker    Smokeless tobacco: Never Used   Vaping Use    Vaping Use: Never used   Substance and Sexual Activity    Alcohol use: Yes     Comment: socially    Drug use: Never    Sexual activity: Not Currently     Comment:    Other Topics Concern    Not on file   Social History Narrative    Not on file     Social Determinants of Health     Financial Resource Strain:     Difficulty of Paying Living Expenses: Not on file   Food Insecurity:     Worried About [email or web address] in the Last Year: Not on file    [address] [employer] in the Last Year: Not on file   Transportation Needs:     Lack of Transportation (Medical): Not on file    Lack of Transportation (Non-Medical):  Not on file   Physical Activity:     Days of Exercise per Week: Not on file    Minutes of Exercise per Session: Not on file   Stress:     Feeling of Stress : Not on file   Social Connections:     Frequency of Communication with Friends and Family: Not on file    Frequency of Social Gatherings with Friends and Family: Not on file    Attends Jehovah's witness Services: Not on file    Active Member of Clubs or Organizations: Not on file    Attends Club or Organization Meetings: Not on file    Marital Status: Not on file   Intimate Partner Violence:     Fear of Current or Ex-Partner: Not on file    Emotionally Abused: Not on file    Physically Abused: Not on file    Sexually Abused: Not on file   Housing Stability:     Unable to Pay for Housing in the Last Year: Not on file    Number of Places Lived in the Last Year: Not on file    Unstable Housing in the Last Year: Not on file     Family History:   Family History   Problem Relation Age of Onset    Cancer Mother     Heart Attack Father     Heart Failure Father     Parkinsonism Sister     Cancer Maternal Grandmother         gastric    Mult Sclerosis Maternal Grandfather     Stroke Paternal Grandfather     Hypertension Son      Review of System  Review of Systems   Respiratory: Positive for apnea. Genitourinary: Positive for vaginal discharge. Musculoskeletal: Positive for back pain. All other systems reviewed and are negative. A review of systems was done by the patient and reviewed by me. Objective:     Vital Signs  Vitals:    12/14/21 0945   BP: (!) 142/65   Pulse: 98   Resp: 14   Temp: 98.2 °F (36.8 °C)   SpO2: 98%     Physical Exam  Vitals and nursing note reviewed. Constitutional:       Appearance: Normal appearance. HENT:      Head: Normocephalic. Eyes:      Conjunctiva/sclera: Conjunctivae normal.   Cardiovascular:      Rate and Rhythm: Normal rate. Pulmonary:      Effort: Pulmonary effort is normal.   Musculoskeletal:         General: Normal range of motion. Cervical back: Normal range of motion. Skin:     General: Skin is warm and dry. Neurological:      General: No focal deficit present. Mental Status: She is alert. Psychiatric:         Mood and Affect: Mood normal.         Behavior: Behavior normal.       External genitalia: normal, no lesions  Vulva: no lesions  Urethra: no caruncle    The ring with support pessary was removed and cleaned. The entire length of the vagina including the vaginal walls were irrigated with H2O2 coated swabs. Patient tolerated procedure well. The pessary was replaced. No results found for this visit on 12/14/21.     Assessment/Plan:     Hina Padilla is a 80 y.o. female with   1. Prolapse of vaginal vault after hysterectomy    2. Vaginal atrophy    3. Vaginal discharge      The patient is to follow up in 2 months for evaluation due to hx of ulcerations. She was counseled on symptoms associated with ulceration or malposition of the device. She was asked to call or return sooner for abnormal discharge, bleeding, spotting, or other concerns. Orders Placed This Encounter   Procedures    TN TREAT VAGINA INFECTION     No orders of the defined types were placed in this encounter.        Brian Layne, APRN - CNP

## 2022-02-15 ENCOUNTER — OFFICE VISIT (OUTPATIENT)
Dept: UROGYNECOLOGY | Age: 87
End: 2022-02-15
Payer: MEDICARE

## 2022-02-15 VITALS
DIASTOLIC BLOOD PRESSURE: 77 MMHG | HEIGHT: 61 IN | BODY MASS INDEX: 30.02 KG/M2 | HEART RATE: 60 BPM | WEIGHT: 159 LBS | SYSTOLIC BLOOD PRESSURE: 133 MMHG

## 2022-02-15 DIAGNOSIS — N95.2 VAGINAL ATROPHY: ICD-10-CM

## 2022-02-15 DIAGNOSIS — N89.8 VAGINAL DISCHARGE: Primary | ICD-10-CM

## 2022-02-15 DIAGNOSIS — N99.3 PROLAPSE OF VAGINAL VAULT AFTER HYSTERECTOMY: ICD-10-CM

## 2022-02-15 PROCEDURE — 1036F TOBACCO NON-USER: CPT | Performed by: NURSE PRACTITIONER

## 2022-02-15 PROCEDURE — 1090F PRES/ABSN URINE INCON ASSESS: CPT | Performed by: NURSE PRACTITIONER

## 2022-02-15 PROCEDURE — 99213 OFFICE O/P EST LOW 20 MIN: CPT | Performed by: NURSE PRACTITIONER

## 2022-02-15 PROCEDURE — 57150 TREAT VAGINA INFECTION: CPT | Performed by: NURSE PRACTITIONER

## 2022-02-15 PROCEDURE — 4040F PNEUMOC VAC/ADMIN/RCVD: CPT | Performed by: NURSE PRACTITIONER

## 2022-02-15 PROCEDURE — G8417 CALC BMI ABV UP PARAM F/U: HCPCS | Performed by: NURSE PRACTITIONER

## 2022-02-15 PROCEDURE — G8484 FLU IMMUNIZE NO ADMIN: HCPCS | Performed by: NURSE PRACTITIONER

## 2022-02-15 PROCEDURE — 1123F ACP DISCUSS/DSCN MKR DOCD: CPT | Performed by: NURSE PRACTITIONER

## 2022-02-15 PROCEDURE — G8427 DOCREV CUR MEDS BY ELIG CLIN: HCPCS | Performed by: NURSE PRACTITIONER

## 2022-02-15 NOTE — PROGRESS NOTES
Date: 2/15/2022     HPI:     Name: Mary Ann Keller  YOB: 1935    CC: Mary Ann Keller returns for a pessary check today. HPI: She reports no bleeding, has discharge, no pain, no discomfot. She is not removing the device herself at home. Do you have vaginal discharge?: Yes  How long have you had this problem?:   years  Please rate the severity of your problem: mild  Anything make it better? Pessary       Ob/Gyn History:    OB History   No obstetric history on file. Past Medical History:   Past Medical History:   Diagnosis Date    Apnea 02/15/2022    Arthritis of both knees     Atrial tachycardia ([facility])     Cellulitis     Chest pain     Chronic midline thoracic back pain     Compression fracture of thoracic vertebra, closed, initial encounter ([facility])     Heart palpitations     HTN (hypertension)     Hyperglyceridemia     Hyperlipidemia     Left cataract     Leukorrhea     Macular degeneration     Midline cystocele     Midline low back pain without sciatica     MICHI (obstructive sleep apnea)     Osteoporosis     Raynaud phenomenon     Snoring     Subacute on chronic vaginitis     Vaginal atrophy     Vaginal discharge     Vaginal discharge 02/15/2022    Vulvovaginitis      Past Surgical History:   Past Surgical History:   Procedure Laterality Date    APPENDECTOMY      BUNIONECTOMY      EYE SURGERY      TONSILLECTOMY      VARICOSE VEIN SURGERY      WISDOM TOOTH EXTRACTION       Current Medications:  Current Outpatient Medications   Medication Sig Dispense Refill    conjugated estrogens (PREMARIN) 0.625 MG/GM vaginal cream Apply pea size manually to the vagina. 2 times per week.  42.5 g 3    lisinopril (PRINIVIL;ZESTRIL) 40 MG tablet       furosemide (LASIX) 20 MG tablet Take 20 mg by mouth daily      dilTIAZem (CARDIZEM CD) 240 MG extended release capsule       ezetimibe-simvastatin (VYTORIN) 10-40 MG per tablet Take 1 tablet by mouth nightly      atorvastatin (LIPITOR) 40 MG tablet       estradiol (ESTRACE) 0.1 MG/GM vaginal cream Apply small amount to vagina every other night with finger       No current facility-administered medications for this visit. Allergies: Allergies   Allergen Reactions    Penicillins Other (See Comments)     Social History:   Social History     Socioeconomic History    Marital status:      Spouse name: Not on file    Number of children: Not on file    Years of education: Not on file    Highest education level: Not on file   Occupational History    Not on file   Tobacco Use    Smoking status: Former Smoker    Smokeless tobacco: Never Used   Vaping Use    Vaping Use: Never used   Substance and Sexual Activity    Alcohol use: Yes     Comment: socially    Drug use: Never    Sexual activity: Not Currently     Comment:    Other Topics Concern    Not on file   Social History Narrative    Not on file     Social Determinants of Health     Financial Resource Strain:     Difficulty of Paying Living Expenses: Not on file   Food Insecurity:     Worried About [address] in the Last Year: Not on file    [address] in the Last Year: Not on file   Transportation Needs:     Lack of Transportation (Medical): Not on file    Lack of Transportation (Non-Medical):  Not on file   Physical Activity:     Days of Exercise per Week: Not on file    Minutes of Exercise per Session: Not on file   Stress:     Feeling of Stress : Not on file   Social Connections:     Frequency of Communication with Friends and Family: Not on file    Frequency of Social Gatherings with Friends and Family: Not on file    Attends Evangelical Services: Not on file    Active Member of Clubs or Organizations: Not on file    Attends Club or Organization Meetings: Not on file    Marital Status: Not on file   Intimate Partner Violence:     Fear of Current or Ex-Partner: Not on file    Emotionally Abused: Not on file    Physically Abused: Not on file    Sexually Abused: Not on file   Housing Stability:     Unable to Pay for Housing in the Last Year: Not on file    Number of Places Lived in the Last Year: Not on file    Unstable Housing in the Last Year: Not on file     Family History:   Family History   Problem Relation Age of Onset    Cancer Mother     Heart Attack Father     Heart Failure Father     Parkinsonism Sister     Cancer Maternal Grandmother         gastric    Mult Sclerosis Maternal Grandfather     Stroke Paternal Grandfather     Hypertension Son      Review of System  Review of Systems   All other systems reviewed and are negative. A review of systems was done by the patient and reviewed by me and scanned into media today. Objective:     Vital Signs  Vitals:    02/15/22 0926   BP: 133/77   Pulse: 60   Weight: 159 lb (72.1 kg)   Height: 5' 1\" (1.549 m)     Physical Exam  Vitals and nursing note reviewed. Constitutional:       Appearance: Normal appearance. HENT:      Head: Normocephalic. Eyes:      Conjunctiva/sclera: Conjunctivae normal.   Cardiovascular:      Rate and Rhythm: Normal rate. Pulmonary:      Effort: Pulmonary effort is normal.   Musculoskeletal:         General: Normal range of motion. Cervical back: Normal range of motion. Skin:     General: Skin is warm and dry. Neurological:      General: No focal deficit present. Mental Status: She is alert. Psychiatric:         Mood and Affect: Mood normal.         Behavior: Behavior normal.       External genitalia: normal, no lesions  Vulva: no lesions  Urethra: no caruncle    The ring with support pessary was removed and cleaned. The entire length of the vagina including the vaginal walls were irrigated with H2O2 coated swabs. Patient tolerated procedure well. The pessary was replaced. No results found for this visit on 02/15/22. Assessment/Plan:     Bora Colin is a 80 y.o. female with   1. Vaginal discharge    2.  Prolapse of vaginal vault after hysterectomy    3. Vaginal atrophy    -Vaginal discharge: vaginal irrigation completed. No ulceration seen and patient reassured    The patient is to follow up in 2 months for evaluation. She was counseled on symptoms associated with ulceration or malposition of the device. She was asked to call or return sooner for abnormal discharge, bleeding, spotting, or other concerns. If no ulcer in 2 months can then resume q 3 month visits  CANDIDO Rodriguez CNP               Orders Placed This Encounter   Procedures    HI TREAT VAGINA INFECTION     No orders of the defined types were placed in this encounter.        CANDIDO Rodriguez CNP

## 2022-04-19 ENCOUNTER — OFFICE VISIT (OUTPATIENT)
Dept: UROGYNECOLOGY | Age: 87
End: 2022-04-19
Payer: MEDICARE

## 2022-04-19 VITALS
TEMPERATURE: 97.2 F | OXYGEN SATURATION: 97 % | HEART RATE: 70 BPM | SYSTOLIC BLOOD PRESSURE: 169 MMHG | DIASTOLIC BLOOD PRESSURE: 50 MMHG

## 2022-04-19 DIAGNOSIS — N99.3 PROLAPSE OF VAGINAL VAULT AFTER HYSTERECTOMY: ICD-10-CM

## 2022-04-19 DIAGNOSIS — N95.2 VAGINAL ATROPHY: ICD-10-CM

## 2022-04-19 DIAGNOSIS — N89.8 VAGINAL DISCHARGE: Primary | ICD-10-CM

## 2022-04-19 DIAGNOSIS — N81.11 CYSTOCELE, MIDLINE: ICD-10-CM

## 2022-04-19 PROCEDURE — 4040F PNEUMOC VAC/ADMIN/RCVD: CPT | Performed by: NURSE PRACTITIONER

## 2022-04-19 PROCEDURE — 57150 TREAT VAGINA INFECTION: CPT | Performed by: NURSE PRACTITIONER

## 2022-04-19 PROCEDURE — G8427 DOCREV CUR MEDS BY ELIG CLIN: HCPCS | Performed by: NURSE PRACTITIONER

## 2022-04-19 PROCEDURE — 1090F PRES/ABSN URINE INCON ASSESS: CPT | Performed by: NURSE PRACTITIONER

## 2022-04-19 PROCEDURE — 1123F ACP DISCUSS/DSCN MKR DOCD: CPT | Performed by: NURSE PRACTITIONER

## 2022-04-19 PROCEDURE — 1036F TOBACCO NON-USER: CPT | Performed by: NURSE PRACTITIONER

## 2022-04-19 PROCEDURE — G8417 CALC BMI ABV UP PARAM F/U: HCPCS | Performed by: NURSE PRACTITIONER

## 2022-04-19 PROCEDURE — 99213 OFFICE O/P EST LOW 20 MIN: CPT | Performed by: NURSE PRACTITIONER

## 2022-04-19 NOTE — PROGRESS NOTES
Date: 4/19/2022     HPI:     Name: Gage Sorto  YOB: 1935    CC: Gage Sorto returns for a pessary check today. HPI: She reports no bleeding, has discharge, no pain, no discomfot. She is not removing the device herself at home. Do you have vaginal discharge?: Yes     Denies bleeding       Ob/Gyn History:    OB History   No obstetric history on file. Past Medical History:   Past Medical History:   Diagnosis Date    Apnea 02/15/2022    Arthritis of both knees     Atrial tachycardia ([facility])     Cellulitis     Chest pain     Chronic midline thoracic back pain     Compression fracture of thoracic vertebra, closed, initial encounter ([facility])     Heart palpitations     HTN (hypertension)     Hyperglyceridemia     Hyperlipidemia     Left cataract     Leukorrhea     Macular degeneration     Midline cystocele     Midline low back pain without sciatica     MICHI (obstructive sleep apnea)     Osteoporosis     Raynaud phenomenon     Snoring     Subacute on chronic vaginitis     Vaginal atrophy     Vaginal discharge     Vaginal discharge 02/15/2022    Vulvovaginitis      Past Surgical History:   Past Surgical History:   Procedure Laterality Date    APPENDECTOMY      BUNIONECTOMY      EYE SURGERY      TONSILLECTOMY      VARICOSE VEIN SURGERY      WISDOM TOOTH EXTRACTION       Current Medications:  Current Outpatient Medications   Medication Sig Dispense Refill    conjugated estrogens (PREMARIN) 0.625 MG/GM vaginal cream Apply pea size manually to the vagina. 2 times per week.  42.5 g 3    lisinopril (PRINIVIL;ZESTRIL) 40 MG tablet       dilTIAZem (CARDIZEM CD) 240 MG extended release capsule       ezetimibe-simvastatin (VYTORIN) 10-40 MG per tablet Take 1 tablet by mouth nightly      atorvastatin (LIPITOR) 40 MG tablet       estradiol (ESTRACE) 0.1 MG/GM vaginal cream Apply small amount to vagina every other night with finger      furosemide (LASIX) 20 MG tablet Take 20 mg by mouth daily (Patient not taking: Reported on 4/19/2022)       No current facility-administered medications for this visit. Allergies: Allergies   Allergen Reactions    Penicillins Other (See Comments)     Social History:   Social History     Socioeconomic History    Marital status:      Spouse name: Not on file    Number of children: Not on file    Years of education: Not on file    Highest education level: Not on file   Occupational History    Not on file   Tobacco Use    Smoking status: Former Smoker    Smokeless tobacco: Never Used   Vaping Use    Vaping Use: Never used   Substance and Sexual Activity    Alcohol use: Yes     Comment: socially    Drug use: Never    Sexual activity: Not Currently     Comment:    Other Topics Concern    Not on file   Social History Narrative    Not on file     Social Determinants of Health     Financial Resource Strain:     Difficulty of Paying Living Expenses: Not on file   Food Insecurity:     Worried About [address] in the Last Year: Not on file    [address] in the Last Year: Not on file   Transportation Needs:     Lack of Transportation (Medical): Not on file    Lack of Transportation (Non-Medical):  Not on file   Physical Activity:     Days of Exercise per Week: Not on file    Minutes of Exercise per Session: Not on file   Stress:     Feeling of Stress : Not on file   Social Connections:     Frequency of Communication with Friends and Family: Not on file    Frequency of Social Gatherings with Friends and Family: Not on file    Attends Church Services: Not on file    Active Member of Clubs or Organizations: Not on file    Attends Club or Organization Meetings: Not on file    Marital Status: Not on file   Intimate Partner Violence:     Fear of Current or Ex-Partner: Not on file    Emotionally Abused: Not on file    Physically Abused: Not on file    Sexually Abused: Not on file   Housing Stability:     Unable to Pay for Housing in the Last Year: Not on file    Number of Places Lived in the Last Year: Not on file    Unstable Housing in the Last Year: Not on file     Family History:   Family History   Problem Relation Age of Onset    Cancer Mother     Heart Attack Father     Heart Failure Father     Parkinsonism Sister     Cancer Maternal Grandmother         gastric    Mult Sclerosis Maternal Grandfather     Stroke Paternal Grandfather     Hypertension Son      Review of System  Review of Systems    A review of systems was done by the patient and reviewed by me. Objective:     Vital Signs  Vitals:    04/19/22 0900   BP: (!) 169/50   Pulse: 70   Temp: 97.2 °F (36.2 °C)   SpO2: 97%     Physical Exam  Vitals and nursing note reviewed. Constitutional:       Appearance: Normal appearance. HENT:      Head: Normocephalic. Eyes:      Conjunctiva/sclera: Conjunctivae normal.   Cardiovascular:      Rate and Rhythm: Normal rate. Pulmonary:      Effort: Pulmonary effort is normal.   Musculoskeletal:         General: Normal range of motion. Cervical back: Normal range of motion. Skin:     General: Skin is warm and dry. Neurological:      General: No focal deficit present. Mental Status: She is alert. Psychiatric:         Mood and Affect: Mood normal.         Behavior: Behavior normal.       External genitalia: normal, no lesions  Vulva: no lesions  Urethra: no caruncle    The ring with support pessary was removed and cleaned. The entire length of the vagina including the vaginal walls were irrigated with H2O2 coated swabs. Patient tolerated procedure well. The pessary was replaced. No results found for this visit on 04/19/22. Assessment/Plan:     Sharon Mckinley is a 80 y.o. female with   1. Vaginal discharge    2. Prolapse of vaginal vault after hysterectomy    3. Vaginal atrophy    4. Cystocele, midline      The patient is to follow up in 3 months for evaluation.  She was counseled on symptoms associated with ulceration or malposition of the device. She was asked to call or return sooner for abnormal discharge, bleeding, spotting, or other concerns. No orders of the defined types were placed in this encounter. No orders of the defined types were placed in this encounter.        Chiki Farrell, APRN - CNP

## 2022-07-19 ENCOUNTER — OFFICE VISIT (OUTPATIENT)
Dept: UROGYNECOLOGY | Age: 87
End: 2022-07-19
Payer: MEDICARE

## 2022-07-19 VITALS
TEMPERATURE: 98 F | OXYGEN SATURATION: 98 % | HEART RATE: 78 BPM | SYSTOLIC BLOOD PRESSURE: 129 MMHG | RESPIRATION RATE: 14 BRPM | DIASTOLIC BLOOD PRESSURE: 66 MMHG

## 2022-07-19 DIAGNOSIS — N89.8 VAGINAL DISCHARGE: Primary | ICD-10-CM

## 2022-07-19 DIAGNOSIS — N81.11 CYSTOCELE, MIDLINE: ICD-10-CM

## 2022-07-19 DIAGNOSIS — N99.3 PROLAPSE OF VAGINAL VAULT AFTER HYSTERECTOMY: ICD-10-CM

## 2022-07-19 PROCEDURE — 99213 OFFICE O/P EST LOW 20 MIN: CPT | Performed by: NURSE PRACTITIONER

## 2022-07-19 PROCEDURE — 57150 TREAT VAGINA INFECTION: CPT | Performed by: NURSE PRACTITIONER

## 2022-07-19 PROCEDURE — 1123F ACP DISCUSS/DSCN MKR DOCD: CPT | Performed by: NURSE PRACTITIONER

## 2022-07-19 RX ORDER — SOLIFENACIN SUCCINATE 5 MG/1
5 TABLET, FILM COATED ORAL DAILY
Qty: 90 TABLET | Refills: 2 | Status: CANCELLED | OUTPATIENT
Start: 2022-07-19 | End: 2023-04-15

## 2022-07-19 NOTE — PROGRESS NOTES
Date: 7/19/2022     HPI:     Name: Casey Ceron  YOB: 1935    CC: Casey Ceron returns for a pessary check today. HPI: She reports no bleeding, no discharge, no pain, no discomfot. She is not removing the device herself at home. Do you have vaginal discharge?: No  How long have you had this problem?:   several year  Please rate the severity of your problem: mild  Anything make it better? No issues with pessary, just here for routine management     Ob/Gyn History:    OB History   No obstetric history on file.       Past Medical History:   Past Medical History:   Diagnosis Date    Apnea 02/15/2022    Arthritis of both knees     Atrial tachycardia ([facility])     Cellulitis     Chest pain     Chronic midline thoracic back pain     Compression fracture of thoracic vertebra, closed, initial encounter ([facility])     Heart palpitations     HTN (hypertension)     Hyperglyceridemia     Hyperlipidemia     Left cataract     Leukorrhea     Macular degeneration     Midline cystocele     Midline low back pain without sciatica     MICHI (obstructive sleep apnea)     Osteoporosis     Raynaud phenomenon     Snoring     Subacute on chronic vaginitis     Vaginal atrophy     Vaginal discharge     Vaginal discharge 02/15/2022    Vulvovaginitis      Past Surgical History:   Past Surgical History:   Procedure Laterality Date    APPENDECTOMY      BUNIONECTOMY      EYE SURGERY      TONSILLECTOMY      VARICOSE VEIN SURGERY      WISDOM TOOTH EXTRACTION       Current Medications:  Current Outpatient Medications   Medication Sig Dispense Refill    lisinopril (PRINIVIL;ZESTRIL) 40 MG tablet       dilTIAZem (CARDIZEM CD) 240 MG extended release capsule       ezetimibe-simvastatin (VYTORIN) 10-40 MG per tablet Take 1 tablet by mouth nightly      atorvastatin (LIPITOR) 40 MG tablet       estradiol (ESTRACE) 0.1 MG/GM vaginal cream Apply small amount to vagina every other night with finger      furosemide (LASIX) 20 MG tablet Take 20 mg by mouth in the morning. (Patient not taking: Reported on 7/19/2022)       No current facility-administered medications for this visit. Allergies: Allergies   Allergen Reactions    Penicillins Other (See Comments)     Social History:   Social History     Socioeconomic History    Marital status:      Spouse name: Not on file    Number of children: Not on file    Years of education: Not on file    Highest education level: Not on file   Occupational History    Not on file   Tobacco Use    Smoking status: Former    Smokeless tobacco: Never   Vaping Use    Vaping Use: Never used   Substance and Sexual Activity    Alcohol use: Yes     Comment: socially    Drug use: Never    Sexual activity: Not Currently     Comment:    Other Topics Concern    Not on file   Social History Narrative    Not on file     Social Determinants of Health     Financial Resource Strain: Not on file   Food Insecurity: Not on file   Transportation Needs: Not on file   Physical Activity: Not on file   Stress: Not on file   Social Connections: Not on file   Intimate Partner Violence: Not on file   Housing Stability: Not on file     Family History:   Family History   Problem Relation Age of Onset    Cancer Mother     Heart Attack Father     Heart Failure Father     Parkinsonism Sister     Cancer Maternal Grandmother         gastric    Mult Sclerosis Maternal Grandfather     Stroke Paternal Grandfather     Hypertension Son          Objective:     Vital Signs  Vitals:    07/19/22 0906   BP: 129/66   Pulse: 78   Resp: 14   Temp: 98 °F (36.7 °C)   SpO2: 98%     Physical Exam  Vitals and nursing note reviewed. Constitutional:       Appearance: Normal appearance. HENT:      Head: Normocephalic. Eyes:      Conjunctiva/sclera: Conjunctivae normal.   Cardiovascular:      Rate and Rhythm: Normal rate. Pulmonary:      Effort: Pulmonary effort is normal.   Musculoskeletal:         General: Normal range of motion.       Cervical back: Normal range of motion. Skin:     General: Skin is warm and dry. Neurological:      General: No focal deficit present. Mental Status: She is alert. Psychiatric:         Mood and Affect: Mood normal.         Behavior: Behavior normal.     External genitalia: normal, no lesions  Vulva: no lesions  Urethra: no caruncle    The ring with support pessary was removed and cleaned. The entire length of the vagina including the vaginal walls were irrigated with H2O2 coated swabs. Patient tolerated procedure well. The pessary was replaced. No results found for this visit on 07/19/22. Assessment/Plan:     Javad Chambers is a 80 y.o. female with   1. Vaginal discharge    2. Prolapse of vaginal vault after hysterectomy    3. Cystocele, midline      The patient is to follow up in 3 months for evaluation. She was counseled on symptoms associated with ulceration or malposition of the device. She was asked to call or return sooner for abnormal discharge, bleeding, spotting, or other concerns. No orders of the defined types were placed in this encounter. No orders of the defined types were placed in this encounter.        Jered Cooper, CANDIDO - CNP

## 2022-10-25 ENCOUNTER — OFFICE VISIT (OUTPATIENT)
Dept: UROGYNECOLOGY | Age: 87
End: 2022-10-25
Payer: MEDICARE

## 2022-10-25 VITALS
HEART RATE: 78 BPM | DIASTOLIC BLOOD PRESSURE: 68 MMHG | TEMPERATURE: 98 F | OXYGEN SATURATION: 98 % | SYSTOLIC BLOOD PRESSURE: 161 MMHG | RESPIRATION RATE: 14 BRPM

## 2022-10-25 DIAGNOSIS — N95.2 VAGINAL ATROPHY: Primary | ICD-10-CM

## 2022-10-25 DIAGNOSIS — N76.5 VAGINAL ULCERATION: ICD-10-CM

## 2022-10-25 DIAGNOSIS — N99.3 PROLAPSE OF VAGINAL VAULT AFTER HYSTERECTOMY: ICD-10-CM

## 2022-10-25 PROCEDURE — 1123F ACP DISCUSS/DSCN MKR DOCD: CPT | Performed by: NURSE PRACTITIONER

## 2022-10-25 PROCEDURE — 1036F TOBACCO NON-USER: CPT | Performed by: NURSE PRACTITIONER

## 2022-10-25 PROCEDURE — 57150 TREAT VAGINA INFECTION: CPT | Performed by: NURSE PRACTITIONER

## 2022-10-25 PROCEDURE — G8484 FLU IMMUNIZE NO ADMIN: HCPCS | Performed by: NURSE PRACTITIONER

## 2022-10-25 PROCEDURE — G8417 CALC BMI ABV UP PARAM F/U: HCPCS | Performed by: NURSE PRACTITIONER

## 2022-10-25 PROCEDURE — 1090F PRES/ABSN URINE INCON ASSESS: CPT | Performed by: NURSE PRACTITIONER

## 2022-10-25 PROCEDURE — 99213 OFFICE O/P EST LOW 20 MIN: CPT | Performed by: NURSE PRACTITIONER

## 2022-10-25 PROCEDURE — G8427 DOCREV CUR MEDS BY ELIG CLIN: HCPCS | Performed by: NURSE PRACTITIONER

## 2022-10-25 NOTE — PROGRESS NOTES
Date: 10/25/2022     HPI:     Name: Frances Niño  YOB: 1935    CC: Frances Niño returns for a pessary check today. HPI: She reports no bleeding, no discharge, no pain, no discomfot. She is not removing the device herself at home. Do you have vaginal discharge?: Yes  How long have you had this problem?:   several years  Please rate the severity of your problem: moderate  Anything make it better? No problems, just here for routine check     Ob/Gyn History:    OB History   No obstetric history on file.       Past Medical History:   Past Medical History:   Diagnosis Date    Apnea 02/15/2022    Arthritis of both knees     Atrial tachycardia ([facility])     Cellulitis     Chest pain     Chronic midline thoracic back pain     Compression fracture of thoracic vertebra, closed, initial encounter ([facility])     Heart palpitations     HTN (hypertension)     Hyperglyceridemia     Hyperlipidemia     Left cataract     Leukorrhea     Macular degeneration     Midline cystocele     Midline low back pain without sciatica     MICHI (obstructive sleep apnea)     Osteoporosis     Raynaud phenomenon     Snoring     Subacute on chronic vaginitis     Vaginal atrophy     Vaginal discharge     Vaginal discharge 02/15/2022    Vulvovaginitis      Past Surgical History:   Past Surgical History:   Procedure Laterality Date    APPENDECTOMY      BUNIONECTOMY      EYE SURGERY      TONSILLECTOMY      VARICOSE VEIN SURGERY      WISDOM TOOTH EXTRACTION       Current Medications:  Current Outpatient Medications   Medication Sig Dispense Refill    lisinopril (PRINIVIL;ZESTRIL) 40 MG tablet       furosemide (LASIX) 20 MG tablet Take 20 mg by mouth daily      dilTIAZem (CARDIZEM CD) 240 MG extended release capsule       ezetimibe-simvastatin (VYTORIN) 10-40 MG per tablet Take 1 tablet by mouth nightly      atorvastatin (LIPITOR) 40 MG tablet       estradiol (ESTRACE) 0.1 MG/GM vaginal cream Apply small amount to vagina every other night with finger       No current facility-administered medications for this visit. Allergies: Allergies   Allergen Reactions    Penicillins Other (See Comments)     Social History:   Social History     Socioeconomic History    Marital status:      Spouse name: Not on file    Number of children: Not on file    Years of education: Not on file    Highest education level: Not on file   Occupational History    Not on file   Tobacco Use    Smoking status: Former    Smokeless tobacco: Never   Vaping Use    Vaping Use: Never used   Substance and Sexual Activity    Alcohol use: Yes     Comment: socially    Drug use: Never    Sexual activity: Not Currently     Comment:    Other Topics Concern    Not on file   Social History Narrative    Not on file     Social Determinants of Health     Financial Resource Strain: Not on file   Food Insecurity: Not on file   Transportation Needs: Not on file   Physical Activity: Not on file   Stress: Not on file   Social Connections: Not on file   Intimate Partner Violence: Not on file   Housing Stability: Not on file     Family History:   Family History   Problem Relation Age of Onset    Cancer Mother     Heart Attack Father     Heart Failure Father     Parkinsonism Sister     Cancer Maternal Grandmother         gastric    Mult Sclerosis Maternal Grandfather     Stroke Paternal Grandfather     Hypertension Son          Objective:     Vital Signs  Vitals:    10/25/22 0943 10/25/22 0944   BP: (!) 161/68 (!) 161/68   Pulse: 78    Resp: 14    Temp: 98 °F (36.7 °C)    SpO2: 98%      Physical Exam  Vitals and nursing note reviewed. Constitutional:       Appearance: Normal appearance. HENT:      Head: Normocephalic. Eyes:      Conjunctiva/sclera: Conjunctivae normal.   Cardiovascular:      Rate and Rhythm: Normal rate. Pulmonary:      Effort: Pulmonary effort is normal.   Musculoskeletal:         General: Normal range of motion. Cervical back: Normal range of motion. Skin:     General: Skin is warm and dry. Neurological:      General: No focal deficit present. Mental Status: She is alert. Psychiatric:         Mood and Affect: Mood normal.         Behavior: Behavior normal.     External genitalia: normal, no lesions  Vulva: no lesions  Urethra: no caruncle    The ring with support pessary was removed and cleaned. The entire length of the vagina including the vaginal walls were irrigated with H2O2 coated swabs. Patient tolerated procedure well. The pessary was not replaced. No results found for this visit on 10/25/22. Assessment/Plan:     Yemi Awad is a 80 y.o. female with   1. Vaginal atrophy    2. Prolapse of vaginal vault after hysterectomy    3. Vaginal ulceration    -Vaginal ulceration:  Irrigation completed  Pessary not reinserted  The patient is to follow up in 6 weeks for reinsertion of pessary. Will be more diligent at using her [address], APRN - CNP                 No orders of the defined types were placed in this encounter. No orders of the defined types were placed in this encounter.        August Zeb, APRN - CNP

## 2022-12-06 ENCOUNTER — OFFICE VISIT (OUTPATIENT)
Dept: UROGYNECOLOGY | Age: 87
End: 2022-12-06
Payer: MEDICARE

## 2022-12-06 VITALS
HEART RATE: 78 BPM | OXYGEN SATURATION: 97 % | SYSTOLIC BLOOD PRESSURE: 140 MMHG | RESPIRATION RATE: 14 BRPM | DIASTOLIC BLOOD PRESSURE: 58 MMHG | TEMPERATURE: 98 F

## 2022-12-06 DIAGNOSIS — N81.11 CYSTOCELE, MIDLINE: ICD-10-CM

## 2022-12-06 DIAGNOSIS — N99.3 PROLAPSE OF VAGINAL VAULT AFTER HYSTERECTOMY: Primary | ICD-10-CM

## 2022-12-06 DIAGNOSIS — N76.5 VAGINAL ULCERATION: ICD-10-CM

## 2022-12-06 DIAGNOSIS — N95.2 VAGINAL ATROPHY: ICD-10-CM

## 2022-12-06 PROCEDURE — G8427 DOCREV CUR MEDS BY ELIG CLIN: HCPCS | Performed by: NURSE PRACTITIONER

## 2022-12-06 PROCEDURE — 1123F ACP DISCUSS/DSCN MKR DOCD: CPT | Performed by: NURSE PRACTITIONER

## 2022-12-06 PROCEDURE — 99213 OFFICE O/P EST LOW 20 MIN: CPT | Performed by: NURSE PRACTITIONER

## 2022-12-06 PROCEDURE — 1090F PRES/ABSN URINE INCON ASSESS: CPT | Performed by: NURSE PRACTITIONER

## 2022-12-06 PROCEDURE — 1036F TOBACCO NON-USER: CPT | Performed by: NURSE PRACTITIONER

## 2022-12-06 PROCEDURE — G8484 FLU IMMUNIZE NO ADMIN: HCPCS | Performed by: NURSE PRACTITIONER

## 2022-12-06 PROCEDURE — G8417 CALC BMI ABV UP PARAM F/U: HCPCS | Performed by: NURSE PRACTITIONER

## 2022-12-06 NOTE — PROGRESS NOTES
12/6/2022       HPI:     Name: Onel Noel  YOB: 1935    CC: Patient is a 80 y.o. presenting for evaluation of  possible reinsertion of pessary after ulcer . HPI: How long have you had this problem? Please rate the severity of your problem: moderate  Anything make it better? Ob/Gyn History:    OB History   No obstetric history on file. Past Medical History:   Past Medical History:   Diagnosis Date    Apnea 02/15/2022    Arthritis of both knees     Atrial tachycardia ([facility])     Cellulitis     Chest pain     Chronic midline thoracic back pain     Compression fracture of thoracic vertebra, closed, initial encounter ([facility])     Heart palpitations     HTN (hypertension)     Hyperglyceridemia     Hyperlipidemia     Left cataract     Leukorrhea     Macular degeneration     Midline cystocele     Midline low back pain without sciatica     MICHI (obstructive sleep apnea)     Osteoporosis     Raynaud phenomenon     Snoring     Subacute on chronic vaginitis     Vaginal atrophy     Vaginal discharge     Vaginal discharge 02/15/2022    Vulvovaginitis      Past Surgical History:   Past Surgical History:   Procedure Laterality Date    APPENDECTOMY      BUNIONECTOMY      EYE SURGERY      TONSILLECTOMY      VARICOSE VEIN SURGERY      WISDOM TOOTH EXTRACTION       Allergies: Allergies   Allergen Reactions    Penicillins Other (See Comments)     Current Medications:  Current Outpatient Medications   Medication Sig Dispense Refill    lisinopril (PRINIVIL;ZESTRIL) 40 MG tablet       furosemide (LASIX) 20 MG tablet Take 20 mg by mouth daily      dilTIAZem (CARDIZEM CD) 240 MG extended release capsule       ezetimibe-simvastatin (VYTORIN) 10-40 MG per tablet Take 1 tablet by mouth nightly      atorvastatin (LIPITOR) 40 MG tablet       estradiol (ESTRACE) 0.1 MG/GM vaginal cream Apply small amount to vagina every other night with finger       No current facility-administered medications for this visit. Social History:   Social History     Socioeconomic History    Marital status:      Spouse name: Not on file    Number of children: Not on file    Years of education: Not on file    Highest education level: Not on file   Occupational History    Not on file   Tobacco Use    Smoking status: Former    Smokeless tobacco: Never   Vaping Use    Vaping Use: Never used   Substance and Sexual Activity    Alcohol use: Yes     Comment: socially    Drug use: Never    Sexual activity: Not Currently     Comment:    Other Topics Concern    Not on file   Social History Narrative    Not on file     Social Determinants of Health     Financial Resource Strain: Not on file   Food Insecurity: Not on file   Transportation Needs: Not on file   Physical Activity: Not on file   Stress: Not on file   Social Connections: Not on file   Intimate Partner Violence: Not on file   Housing Stability: Not on file     Family History:   Family History   Problem Relation Age of Onset    Cancer Mother     Heart Attack Father     Heart Failure Father     Parkinsonism Sister     Cancer Maternal Grandmother         gastric    Mult Sclerosis Maternal Grandfather     Stroke Paternal Grandfather     Hypertension Son          Objective:     Vitals  Vitals:    12/06/22 0845 12/06/22 0846   BP: (!) 140/58 (!) 140/58   Pulse: 78    Resp: 14    Temp: 98 °F (36.7 °C)    SpO2: 97%      Physical Exam  Physical Exam  Vitals and nursing note reviewed. Constitutional:       Appearance: Normal appearance. HENT:      Head: Normocephalic. Eyes:      Conjunctiva/sclera: Conjunctivae normal.   Cardiovascular:      Rate and Rhythm: Normal rate. Pulmonary:      Effort: Pulmonary effort is normal.   Musculoskeletal:         General: Normal range of motion. Cervical back: Normal range of motion. Skin:     General: Skin is warm and dry. Neurological:      General: No focal deficit present. Mental Status: She is alert.    Psychiatric: Mood and Affect: Mood normal.         Behavior: Behavior normal.       No results found for this visit on 12/06/22. Assessment/Plan:     Blank Kaiser is a 80 y.o. female with   1. Prolapse of vaginal vault after hysterectomy    2. Vaginal atrophy    3. Vaginal ulceration    4. Cystocele, midline    -vaginal ulceration:  resolved  -Prolapse:  pessary reinserted. Continue use of vaginal estrogen cream.  Juwan Ewing, CANDIDO - CNP      No orders of the defined types were placed in this encounter. No orders of the defined types were placed in this encounter.       Juwan Ewing, APRN - CNP

## 2023-02-07 ENCOUNTER — OFFICE VISIT (OUTPATIENT)
Dept: UROGYNECOLOGY | Age: 88
End: 2023-02-07
Payer: MEDICARE

## 2023-02-07 VITALS
OXYGEN SATURATION: 99 % | HEART RATE: 60 BPM | TEMPERATURE: 98 F | SYSTOLIC BLOOD PRESSURE: 138 MMHG | RESPIRATION RATE: 16 BRPM | DIASTOLIC BLOOD PRESSURE: 65 MMHG

## 2023-02-07 DIAGNOSIS — L92.9 GRANULATION TISSUE: Primary | ICD-10-CM

## 2023-02-07 DIAGNOSIS — N95.2 VAGINAL ATROPHY: ICD-10-CM

## 2023-02-07 DIAGNOSIS — N89.8 VAGINAL DISCHARGE: ICD-10-CM

## 2023-02-07 PROCEDURE — 17250 CHEM CAUT OF GRANLTJ TISSUE: CPT | Performed by: NURSE PRACTITIONER

## 2023-02-07 PROCEDURE — 1090F PRES/ABSN URINE INCON ASSESS: CPT | Performed by: NURSE PRACTITIONER

## 2023-02-07 PROCEDURE — 1123F ACP DISCUSS/DSCN MKR DOCD: CPT | Performed by: NURSE PRACTITIONER

## 2023-02-07 PROCEDURE — G8427 DOCREV CUR MEDS BY ELIG CLIN: HCPCS | Performed by: NURSE PRACTITIONER

## 2023-02-07 PROCEDURE — 99213 OFFICE O/P EST LOW 20 MIN: CPT | Performed by: NURSE PRACTITIONER

## 2023-02-07 PROCEDURE — 1036F TOBACCO NON-USER: CPT | Performed by: NURSE PRACTITIONER

## 2023-02-07 PROCEDURE — G8417 CALC BMI ABV UP PARAM F/U: HCPCS | Performed by: NURSE PRACTITIONER

## 2023-02-07 PROCEDURE — 57150 TREAT VAGINA INFECTION: CPT | Performed by: NURSE PRACTITIONER

## 2023-02-07 PROCEDURE — G8484 FLU IMMUNIZE NO ADMIN: HCPCS | Performed by: NURSE PRACTITIONER

## 2023-02-07 NOTE — PROGRESS NOTES
Date: 2/7/2023     HPI:     Name: Uma Pearson  YOB: 1935    CC: Uma Pearson returns for a pessary check today. HPI: She reports no bleeding, has discharge, no pain, no discomfot. She is not removing the device herself at home. Do you have vaginal discharge?: Yes  How long have you had this problem?:   years  Please rate the severity of your problem: moderate  Anything make it better? Pessary     Ob/Gyn History:    OB History   No obstetric history on file.       Past Medical History:   Past Medical History:   Diagnosis Date    Apnea 02/15/2022    Arthritis of both knees     Atrial tachycardia ([facility])     Cellulitis     Chest pain     Chronic midline thoracic back pain     Compression fracture of thoracic vertebra, closed, initial encounter ([facility])     Heart palpitations     HTN (hypertension)     Hyperglyceridemia     Hyperlipidemia     Left cataract     Leukorrhea     Macular degeneration     Midline cystocele     Midline low back pain without sciatica     MICHI (obstructive sleep apnea)     Osteoporosis     Raynaud phenomenon     Snoring     Subacute on chronic vaginitis     Vaginal atrophy     Vaginal discharge     Vaginal discharge 02/15/2022    Vulvovaginitis      Past Surgical History:   Past Surgical History:   Procedure Laterality Date    APPENDECTOMY      BUNIONECTOMY      EYE SURGERY      TONSILLECTOMY      VARICOSE VEIN SURGERY      WISDOM TOOTH EXTRACTION       Current Medications:  Current Outpatient Medications   Medication Sig Dispense Refill    lisinopril (PRINIVIL;ZESTRIL) 40 MG tablet       furosemide (LASIX) 20 MG tablet Take 20 mg by mouth daily      dilTIAZem (CARDIZEM CD) 240 MG extended release capsule       ezetimibe-simvastatin (VYTORIN) 10-40 MG per tablet Take 1 tablet by mouth nightly      atorvastatin (LIPITOR) 40 MG tablet       estradiol (ESTRACE) 0.1 MG/GM vaginal cream Apply small amount to vagina every other night with finger       No current facility-administered medications for this visit. Allergies: Allergies   Allergen Reactions    Penicillins Other (See Comments)     Social History:   Social History     Socioeconomic History    Marital status:      Spouse name: Not on file    Number of children: Not on file    Years of education: Not on file    Highest education level: Not on file   Occupational History    Not on file   Tobacco Use    Smoking status: Former    Smokeless tobacco: Never   Vaping Use    Vaping Use: Never used   Substance and Sexual Activity    Alcohol use: Yes     Comment: socially    Drug use: Never    Sexual activity: Not Currently     Comment:    Other Topics Concern    Not on file   Social History Narrative    Not on file     Social Determinants of Health     Financial Resource Strain: Not on file   Food Insecurity: Not on file   Transportation Needs: Not on file   Physical Activity: Not on file   Stress: Not on file   Social Connections: Not on file   Intimate Partner Violence: Not on file   Housing Stability: Not on file     Family History:   Family History   Problem Relation Age of Onset    Cancer Mother     Heart Attack Father     Heart Failure Father     Parkinsonism Sister     Cancer Maternal Grandmother         gastric    Mult Sclerosis Maternal Grandfather     Stroke Paternal Grandfather     Hypertension Son          Objective:     Vital Signs  Vitals:    02/07/23 0830   BP: 138/65   Pulse: 60   Resp: 16   Temp: 98 °F (36.7 °C)   SpO2: 99%     Physical Exam  Vitals and nursing note reviewed. Constitutional:       Appearance: Normal appearance. HENT:      Head: Normocephalic. Eyes:      Conjunctiva/sclera: Conjunctivae normal.   Cardiovascular:      Rate and Rhythm: Normal rate. Pulmonary:      Effort: Pulmonary effort is normal.   Musculoskeletal:         General: Normal range of motion. Cervical back: Normal range of motion. Skin:     General: Skin is warm and dry.    Neurological: General: No focal deficit present. Mental Status: She is alert. Psychiatric:         Mood and Affect: Mood normal.         Behavior: Behavior normal.     External genitalia: normal, no lesions  Vulva: no lesions  Vagina:  slight discharge. Small granulation tissue. Silver nitrate applied  Urethra: no caruncle    The ring with support pessary was removed and cleaned. The entire length of the vagina including the vaginal walls were irrigated with H2O2 coated swabs. Patient tolerated procedure well. The pessary was replaced. No results found for this visit on 02/07/23. Assessment/Plan:     Ara Vivas is a 80 y.o. female with   1. Granulation tissue    2. Vaginal discharge    -Granulation tissue:  Silver nitrate applied. Patient tolerated well  -Vaginal atrophy:  Continue use of vaginal estrogen cream  The patient is to follow up in 2 months for evaluation. She was counseled on symptoms associated with ulceration or malposition of the device. She was asked to call or return sooner for abnormal discharge, bleeding, spotting, or other concerns. Orders Placed This Encounter   Procedures    VT CHEMICAL CAUTERIZATION OF GRANULATION TISSUE     No orders of the defined types were placed in this encounter.        Hanny Augustine, APRN - CNP

## 2023-07-11 ENCOUNTER — OFFICE VISIT (OUTPATIENT)
Dept: UROGYNECOLOGY | Age: 88
End: 2023-07-11
Payer: MEDICARE

## 2023-07-11 VITALS
RESPIRATION RATE: 16 BRPM | TEMPERATURE: 98 F | SYSTOLIC BLOOD PRESSURE: 138 MMHG | OXYGEN SATURATION: 98 % | HEART RATE: 62 BPM | DIASTOLIC BLOOD PRESSURE: 54 MMHG

## 2023-07-11 DIAGNOSIS — N81.11 CYSTOCELE, MIDLINE: ICD-10-CM

## 2023-07-11 DIAGNOSIS — N95.2 VAGINAL ATROPHY: ICD-10-CM

## 2023-07-11 DIAGNOSIS — N99.3 PROLAPSE OF VAGINAL VAULT AFTER HYSTERECTOMY: Primary | ICD-10-CM

## 2023-07-11 PROCEDURE — 1123F ACP DISCUSS/DSCN MKR DOCD: CPT | Performed by: NURSE PRACTITIONER

## 2023-07-11 PROCEDURE — 99213 OFFICE O/P EST LOW 20 MIN: CPT | Performed by: NURSE PRACTITIONER

## 2023-07-11 PROCEDURE — 57150 TREAT VAGINA INFECTION: CPT | Performed by: NURSE PRACTITIONER

## 2023-07-11 PROCEDURE — 1090F PRES/ABSN URINE INCON ASSESS: CPT | Performed by: NURSE PRACTITIONER

## 2023-07-11 PROCEDURE — 1036F TOBACCO NON-USER: CPT | Performed by: NURSE PRACTITIONER

## 2023-07-11 PROCEDURE — G8427 DOCREV CUR MEDS BY ELIG CLIN: HCPCS | Performed by: NURSE PRACTITIONER

## 2023-07-11 PROCEDURE — G8421 BMI NOT CALCULATED: HCPCS | Performed by: NURSE PRACTITIONER

## 2023-07-11 NOTE — PROGRESS NOTES
Date: 7/11/2023     HPI:     Name: Corrie Mills  YOB: 1935    CC: Corrie Mills returns for a pessary check today. HPI: She reports no bleeding, no discharge, no pain. She is not removing the device herself at home. Do you have vaginal discharge?: Yes, but pt states \"normal\"  How long have you had this problem?:   years  Please rate the severity of your problem: moderate  Anything make it better? Pessary     Ob/Gyn History:    OB History   No obstetric history on file.       Past Medical History:   Past Medical History:   Diagnosis Date    Apnea 02/15/2022    Arthritis of both knees     Atrial tachycardia ([facility])     Cellulitis     Chest pain     Chronic midline thoracic back pain     Compression fracture of thoracic vertebra, closed, initial encounter ([facility])     Heart palpitations     HTN (hypertension)     Hyperglyceridemia     Hyperlipidemia     Left cataract     Leukorrhea     Macular degeneration     Midline cystocele     Midline low back pain without sciatica     MICHI (obstructive sleep apnea)     Osteoporosis     Raynaud phenomenon     Snoring     Subacute on chronic vaginitis     Vaginal atrophy     Vaginal discharge     Vaginal discharge 02/15/2022    Vulvovaginitis      Past Surgical History:   Past Surgical History:   Procedure Laterality Date    APPENDECTOMY      BUNIONECTOMY      EYE SURGERY      TONSILLECTOMY      VARICOSE VEIN SURGERY      WISDOM TOOTH EXTRACTION       Current Medications:  Current Outpatient Medications   Medication Sig Dispense Refill    Multiple Vitamins-Minerals (PRESERVISION AREDS 2 PO) Take by mouth      lisinopril (PRINIVIL;ZESTRIL) 40 MG tablet       dilTIAZem (CARDIZEM CD) 240 MG extended release capsule       ezetimibe-simvastatin (VYTORIN) 10-40 MG per tablet Take 1 tablet by mouth nightly      atorvastatin (LIPITOR) 40 MG tablet       estradiol (ESTRACE) 0.1 MG/GM vaginal cream Apply small amount to vagina every other night with finger       No current

## 2023-10-10 ENCOUNTER — OFFICE VISIT (OUTPATIENT)
Dept: UROGYNECOLOGY | Age: 88
End: 2023-10-10
Payer: MEDICARE

## 2023-10-10 VITALS
HEART RATE: 60 BPM | DIASTOLIC BLOOD PRESSURE: 68 MMHG | SYSTOLIC BLOOD PRESSURE: 165 MMHG | RESPIRATION RATE: 16 BRPM | OXYGEN SATURATION: 92 % | TEMPERATURE: 97.9 F

## 2023-10-10 DIAGNOSIS — N99.3 PROLAPSE OF VAGINAL VAULT AFTER HYSTERECTOMY: ICD-10-CM

## 2023-10-10 DIAGNOSIS — N81.11 CYSTOCELE, MIDLINE: Primary | ICD-10-CM

## 2023-10-10 DIAGNOSIS — N95.2 VAGINAL ATROPHY: ICD-10-CM

## 2023-10-10 DIAGNOSIS — N89.8 VAGINAL DISCHARGE: ICD-10-CM

## 2023-10-10 PROCEDURE — G8484 FLU IMMUNIZE NO ADMIN: HCPCS | Performed by: NURSE PRACTITIONER

## 2023-10-10 PROCEDURE — 1036F TOBACCO NON-USER: CPT | Performed by: NURSE PRACTITIONER

## 2023-10-10 PROCEDURE — 57150 TREAT VAGINA INFECTION: CPT | Performed by: NURSE PRACTITIONER

## 2023-10-10 PROCEDURE — G8427 DOCREV CUR MEDS BY ELIG CLIN: HCPCS | Performed by: NURSE PRACTITIONER

## 2023-10-10 PROCEDURE — G8421 BMI NOT CALCULATED: HCPCS | Performed by: NURSE PRACTITIONER

## 2023-10-10 PROCEDURE — 1090F PRES/ABSN URINE INCON ASSESS: CPT | Performed by: NURSE PRACTITIONER

## 2023-10-10 PROCEDURE — 1123F ACP DISCUSS/DSCN MKR DOCD: CPT | Performed by: NURSE PRACTITIONER

## 2023-10-10 PROCEDURE — 99213 OFFICE O/P EST LOW 20 MIN: CPT | Performed by: NURSE PRACTITIONER

## 2023-10-10 RX ORDER — DILTIAZEM HYDROCHLORIDE 300 MG/1
CAPSULE, COATED, EXTENDED RELEASE ORAL
COMMUNITY
Start: 2023-09-27 | End: 2023-10-10 | Stop reason: SDUPTHER

## 2023-10-10 RX ORDER — TRIAMCINOLONE ACETONIDE 1 MG/G
CREAM TOPICAL
COMMUNITY
Start: 2023-07-21

## 2023-10-10 NOTE — PROGRESS NOTES
Date: 10/10/2023     HPI:     Name: Patric Ayers  YOB: 1935    CC: Patric Ayers returns for a pessary check today. HPI: She reports no bleeding, no discharge, no pain. She is not removing the device herself at home. Do you have vaginal discharge?: No  How long have you had this problem?:     Please rate the severity of your problem: mild  Anything make it better? Remains pleased with pessary     Ob/Gyn History:    OB History   No obstetric history on file.       Past Medical History:   Past Medical History:   Diagnosis Date    Apnea 02/15/2022    Arthritis of both knees     Atrial tachycardia     Cellulitis     Chest pain     Chronic midline thoracic back pain     Compression fracture of thoracic vertebra, closed, initial encounter ([facility])     Heart palpitations     HTN (hypertension)     Hyperglyceridemia     Hyperlipidemia     Left cataract     Leukorrhea     Macular degeneration     Midline cystocele     Midline low back pain without sciatica     MICHI (obstructive sleep apnea)     Osteoporosis     Raynaud phenomenon     Snoring     Subacute on chronic vaginitis     Vaginal atrophy     Vaginal discharge     Vaginal discharge 02/15/2022    Vulvovaginitis      Past Surgical History:   Past Surgical History:   Procedure Laterality Date    APPENDECTOMY      BUNIONECTOMY      EYE SURGERY      TONSILLECTOMY      VARICOSE VEIN SURGERY      WISDOM TOOTH EXTRACTION       Current Medications:  Current Outpatient Medications   Medication Sig Dispense Refill    triamcinolone (KENALOG) 0.1 % cream APPLY TOPICALLY TO THE AFFECTED AREA TWICE DAILY UNTIL GONE THEN AS NEEDED      Multiple Vitamins-Minerals (PRESERVISION AREDS 2 PO) Take by mouth      lisinopril (PRINIVIL;ZESTRIL) 40 MG tablet       dilTIAZem (CARDIZEM CD) 240 MG extended release capsule       ezetimibe-simvastatin (VYTORIN) 10-40 MG per tablet Take 1 tablet by mouth nightly      atorvastatin (LIPITOR) 40 MG tablet       estradiol (ESTRACE)

## 2024-01-11 ENCOUNTER — OFFICE VISIT (OUTPATIENT)
Dept: UROGYNECOLOGY | Age: 89
End: 2024-01-11

## 2024-01-11 VITALS
DIASTOLIC BLOOD PRESSURE: 61 MMHG | RESPIRATION RATE: 16 BRPM | OXYGEN SATURATION: 97 % | SYSTOLIC BLOOD PRESSURE: 122 MMHG | HEART RATE: 55 BPM | TEMPERATURE: 97.6 F

## 2024-01-11 DIAGNOSIS — N89.8 VAGINAL DISCHARGE: ICD-10-CM

## 2024-01-11 DIAGNOSIS — N99.3 PROLAPSE OF VAGINAL VAULT AFTER HYSTERECTOMY: ICD-10-CM

## 2024-01-11 DIAGNOSIS — N95.2 VAGINAL ATROPHY: ICD-10-CM

## 2024-01-11 DIAGNOSIS — N81.11 CYSTOCELE, MIDLINE: Primary | ICD-10-CM

## 2024-01-11 NOTE — PROGRESS NOTES
results found for this visit on 01/11/24.    Assessment/Plan:     Kristi Jacobs is a 88 y.o. female with   1. Cystocele, midline    2. Prolapse of vaginal vault after hysterectomy    3. Vaginal discharge    4. Vaginal atrophy      The patient is to follow up in 3 months for evaluation. She was counseled on symptoms associated with ulceration or malposition of the device. She was asked to call or return sooner for abnormal discharge, bleeding, spotting, or other concerns.             No orders of the defined types were placed in this encounter.    No orders of the defined types were placed in this encounter.       Bridget Yan, APRN - CNP

## 2024-04-11 ENCOUNTER — OFFICE VISIT (OUTPATIENT)
Dept: UROGYNECOLOGY | Age: 89
End: 2024-04-11

## 2024-04-11 VITALS
RESPIRATION RATE: 18 BRPM | HEART RATE: 61 BPM | OXYGEN SATURATION: 97 % | TEMPERATURE: 98.1 F | DIASTOLIC BLOOD PRESSURE: 73 MMHG | SYSTOLIC BLOOD PRESSURE: 148 MMHG

## 2024-04-11 DIAGNOSIS — N99.3 PROLAPSE OF VAGINAL VAULT AFTER HYSTERECTOMY: ICD-10-CM

## 2024-04-11 DIAGNOSIS — N89.8 VAGINAL DISCHARGE: ICD-10-CM

## 2024-04-11 DIAGNOSIS — N81.11 CYSTOCELE, MIDLINE: Primary | ICD-10-CM

## 2024-04-11 RX ORDER — ERGOCALCIFEROL 1.25 MG/1
CAPSULE ORAL
COMMUNITY
Start: 2024-04-09

## 2024-04-11 NOTE — PROGRESS NOTES
mouth nightly      atorvastatin (LIPITOR) 40 MG tablet       estradiol (ESTRACE) 0.1 MG/GM vaginal cream Apply small amount to vagina every other night with finger       No current facility-administered medications for this visit.     Allergies:   Allergies   Allergen Reactions    Penicillins Other (See Comments)     Social History:   Social History     Socioeconomic History    Marital status:      Spouse name: Not on file    Number of children: Not on file    Years of education: Not on file    Highest education level: Not on file   Occupational History    Not on file   Tobacco Use    Smoking status: Former    Smokeless tobacco: Never   Vaping Use    Vaping Use: Never used   Substance and Sexual Activity    Alcohol use: Yes     Comment: socially    Drug use: Never    Sexual activity: Not Currently     Comment:    Other Topics Concern    Not on file   Social History Narrative    Not on file     Social Determinants of Health     Financial Resource Strain: Not on file   Food Insecurity: Not on file   Transportation Needs: Not on file   Physical Activity: Not on file   Stress: Not on file   Social Connections: Not on file   Intimate Partner Violence: Not on file   Housing Stability: Not on file     Family History:   Family History   Problem Relation Age of Onset    Cancer Mother     Heart Attack Father     Heart Failure Father     Parkinsonism Sister     Cancer Maternal Grandmother         gastric    Mult Sclerosis Maternal Grandfather     Stroke Paternal Grandfather     Hypertension Son          Objective:     Vital Signs  Vitals:    04/11/24 0833 04/11/24 0835   BP: (!) 164/76 (!) 148/73   Pulse: 64 61   Resp: 18    Temp: 98.1 °F (36.7 °C)    SpO2: 97%      Physical Exam  External genitalia: normal, no lesions  Vulva: no lesions  Urethra: no caruncle    The ring with support pessary was removed and cleaned. The entire length of the vagina including the vaginal walls were irrigated with H2O2 coated swabs.

## 2024-07-09 ENCOUNTER — OFFICE VISIT (OUTPATIENT)
Dept: UROGYNECOLOGY | Age: 89
End: 2024-07-09
Payer: MEDICARE

## 2024-07-09 VITALS
DIASTOLIC BLOOD PRESSURE: 63 MMHG | SYSTOLIC BLOOD PRESSURE: 144 MMHG | RESPIRATION RATE: 16 BRPM | TEMPERATURE: 97.8 F | HEART RATE: 50 BPM | OXYGEN SATURATION: 98 %

## 2024-07-09 DIAGNOSIS — N99.3 PROLAPSE OF VAGINAL VAULT AFTER HYSTERECTOMY: ICD-10-CM

## 2024-07-09 DIAGNOSIS — N81.11 CYSTOCELE, MIDLINE: Primary | ICD-10-CM

## 2024-07-09 DIAGNOSIS — N89.8 VAGINAL DISCHARGE: ICD-10-CM

## 2024-07-09 PROCEDURE — 1123F ACP DISCUSS/DSCN MKR DOCD: CPT | Performed by: NURSE PRACTITIONER

## 2024-07-09 PROCEDURE — 57150 TREAT VAGINA INFECTION: CPT | Performed by: NURSE PRACTITIONER

## 2024-07-09 PROCEDURE — G8427 DOCREV CUR MEDS BY ELIG CLIN: HCPCS | Performed by: NURSE PRACTITIONER

## 2024-07-09 PROCEDURE — G8421 BMI NOT CALCULATED: HCPCS | Performed by: NURSE PRACTITIONER

## 2024-07-09 PROCEDURE — 99212 OFFICE O/P EST SF 10 MIN: CPT | Performed by: NURSE PRACTITIONER

## 2024-07-09 PROCEDURE — 1036F TOBACCO NON-USER: CPT | Performed by: NURSE PRACTITIONER

## 2024-07-09 PROCEDURE — 1090F PRES/ABSN URINE INCON ASSESS: CPT | Performed by: NURSE PRACTITIONER

## 2024-07-09 RX ORDER — POLYMYXIN B SULFATE AND TRIMETHOPRIM 1; 10000 MG/ML; [USP'U]/ML
1 SOLUTION OPHTHALMIC
COMMUNITY
Start: 2024-06-02

## 2024-07-09 RX ORDER — OLOPATADINE HYDROCHLORIDE 2 MG/ML
1 SOLUTION/ DROPS OPHTHALMIC DAILY
COMMUNITY
Start: 2024-06-02

## 2024-07-09 RX ORDER — DILTIAZEM HYDROCHLORIDE 300 MG/1
CAPSULE, EXTENDED RELEASE ORAL
COMMUNITY
Start: 2024-05-09

## 2024-07-09 NOTE — PROGRESS NOTES
Date: 7/9/2024     HPI:     Name: Kristi Jacobs  YOB: 1935    CC: Kristi Jacobs returns for a pessary check today.  HPI: She reports no bleeding, has discharge, no pain, no discomfort. She is not removing the device herself at home.   Do you have vaginal discharge?: Yes  How long have you had this problem?:     Please rate the severity of your problem: mild  Anything make it better? Pessary      Doing well with pessary. No other complaints   Ob/Gyn History:    OB History   No obstetric history on file.      Past Medical History:   Past Medical History:   Diagnosis Date    Apnea 02/15/2022    Arthritis of both knees     Atrial tachycardia ([facility])     Cellulitis     Chest pain     Chronic midline thoracic back pain     Compression fracture of thoracic vertebra, closed, initial encounter ([facility])     Heart palpitations     HTN (hypertension)     Hyperglyceridemia     Hyperlipidemia     Left cataract     Leukorrhea     Macular degeneration     Midline cystocele     Midline low back pain without sciatica     IMCHI (obstructive sleep apnea)     Osteoporosis     Raynaud phenomenon     Snoring     Subacute on chronic vaginitis     Vaginal atrophy     Vaginal discharge     Vaginal discharge 02/15/2022    Vulvovaginitis      Past Surgical History:   Past Surgical History:   Procedure Laterality Date    APPENDECTOMY      BUNIONECTOMY      EYE SURGERY      TONSILLECTOMY      VARICOSE VEIN SURGERY      WISDOM TOOTH EXTRACTION       Current Medications:  Current Outpatient Medications   Medication Sig Dispense Refill    dilTIAZem (TIAZAC) 300 MG extended release capsule       olopatadine (PATADAY) 0.2 % SOLN ophthalmic solution Apply 1 drop to eye daily      vitamin D (ERGOCALCIFEROL) 1.25 MG (23028 UT) CAPS capsule       Multiple Vitamins-Minerals (PRESERVISION AREDS 2 PO) Take by mouth      lisinopril (PRINIVIL;ZESTRIL) 40 MG tablet       dilTIAZem (CARDIZEM CD) 240 MG extended release capsule

## 2024-10-15 ENCOUNTER — OFFICE VISIT (OUTPATIENT)
Dept: UROGYNECOLOGY | Age: 89
End: 2024-10-15
Payer: MEDICARE

## 2024-10-15 VITALS
SYSTOLIC BLOOD PRESSURE: 145 MMHG | TEMPERATURE: 97.5 F | RESPIRATION RATE: 16 BRPM | DIASTOLIC BLOOD PRESSURE: 50 MMHG | HEART RATE: 64 BPM | OXYGEN SATURATION: 93 %

## 2024-10-15 DIAGNOSIS — N99.3 PROLAPSE OF VAGINAL VAULT AFTER HYSTERECTOMY: ICD-10-CM

## 2024-10-15 DIAGNOSIS — N95.2 VAGINAL ATROPHY: ICD-10-CM

## 2024-10-15 DIAGNOSIS — N89.8 VAGINAL DISCHARGE: ICD-10-CM

## 2024-10-15 DIAGNOSIS — N81.11 CYSTOCELE, MIDLINE: Primary | ICD-10-CM

## 2024-10-15 PROCEDURE — 1123F ACP DISCUSS/DSCN MKR DOCD: CPT | Performed by: NURSE PRACTITIONER

## 2024-10-15 PROCEDURE — G8421 BMI NOT CALCULATED: HCPCS | Performed by: NURSE PRACTITIONER

## 2024-10-15 PROCEDURE — 99212 OFFICE O/P EST SF 10 MIN: CPT | Performed by: NURSE PRACTITIONER

## 2024-10-15 PROCEDURE — G8484 FLU IMMUNIZE NO ADMIN: HCPCS | Performed by: NURSE PRACTITIONER

## 2024-10-15 PROCEDURE — 1090F PRES/ABSN URINE INCON ASSESS: CPT | Performed by: NURSE PRACTITIONER

## 2024-10-15 PROCEDURE — 1036F TOBACCO NON-USER: CPT | Performed by: NURSE PRACTITIONER

## 2024-10-15 PROCEDURE — G8427 DOCREV CUR MEDS BY ELIG CLIN: HCPCS | Performed by: NURSE PRACTITIONER

## 2024-10-15 NOTE — PROGRESS NOTES
discharge, bleeding, spotting, or other concerns.             No orders of the defined types were placed in this encounter.    No orders of the defined types were placed in this encounter.       Bridget Yan, APRN - CNP

## 2025-01-15 NOTE — PROGRESS NOTES
Date: 1/16/2025     HPI:     Name: Kristi Jacobs  YOB: 1935    CC: Kristi Jacobs returns for a pessary check today.    HPI: She reports no bleeding, has discharge, no pain, no discomfot. She is not removing the device herself at home.   Do you have vaginal discharge?:     How long have you had this problem?:   years  Please rate the severity of your problem:   Anything make it better?   Patient (has/has not) been using vaginal estrogen cream as instructed at last visit      Ob/Gyn History:    OB History   No obstetric history on file.      Past Medical History:   Past Medical History:   Diagnosis Date    Apnea 02/15/2022    Arthritis of both knees     Atrial tachycardia ([facility])     Cellulitis     Chest pain     Chronic midline thoracic back pain     Compression fracture of thoracic vertebra, closed, initial encounter ([facility])     Heart palpitations     HTN (hypertension)     Hyperglyceridemia     Hyperlipidemia     Left cataract     Leukorrhea     Macular degeneration     Midline cystocele     Midline low back pain without sciatica     MICHI (obstructive sleep apnea)     Osteoporosis     Raynaud phenomenon     Snoring     Subacute on chronic vaginitis     Vaginal atrophy     Vaginal discharge     Vaginal discharge 02/15/2022    Vulvovaginitis      Past Surgical History:   Past Surgical History:   Procedure Laterality Date    APPENDECTOMY      BUNIONECTOMY      EYE SURGERY      TONSILLECTOMY      VARICOSE VEIN SURGERY      WISDOM TOOTH EXTRACTION       Current Medications:  Current Outpatient Medications   Medication Sig Dispense Refill    dilTIAZem (TIAZAC) 300 MG extended release capsule       olopatadine (PATADAY) 0.2 % SOLN ophthalmic solution Apply 1 drop to eye daily      trimethoprim-polymyxin b (POLYTRIM) 87812-6.1 UNIT/ML-% ophthalmic solution Apply 1 drop to eye      vitamin D (ERGOCALCIFEROL) 1.25 MG (52163 UT) CAPS capsule       Multiple Vitamins-Minerals (PRESERVISION AREDS 2 PO) Take by

## 2025-01-16 ENCOUNTER — OFFICE VISIT (OUTPATIENT)
Dept: UROGYNECOLOGY | Age: 89
End: 2025-01-16
Payer: MEDICARE

## 2025-01-16 VITALS
RESPIRATION RATE: 16 BRPM | TEMPERATURE: 98 F | DIASTOLIC BLOOD PRESSURE: 67 MMHG | OXYGEN SATURATION: 98 % | HEART RATE: 67 BPM | SYSTOLIC BLOOD PRESSURE: 148 MMHG

## 2025-01-16 DIAGNOSIS — N89.8 VAGINAL DISCHARGE: ICD-10-CM

## 2025-01-16 DIAGNOSIS — N99.3 PROLAPSE OF VAGINAL VAULT AFTER HYSTERECTOMY: ICD-10-CM

## 2025-01-16 DIAGNOSIS — N81.11 CYSTOCELE, MIDLINE: Primary | ICD-10-CM

## 2025-01-16 PROCEDURE — G8427 DOCREV CUR MEDS BY ELIG CLIN: HCPCS | Performed by: NURSE PRACTITIONER

## 2025-01-16 PROCEDURE — 1090F PRES/ABSN URINE INCON ASSESS: CPT | Performed by: NURSE PRACTITIONER

## 2025-01-16 PROCEDURE — 1036F TOBACCO NON-USER: CPT | Performed by: NURSE PRACTITIONER

## 2025-01-16 PROCEDURE — 1160F RVW MEDS BY RX/DR IN RCRD: CPT | Performed by: NURSE PRACTITIONER

## 2025-01-16 PROCEDURE — 99212 OFFICE O/P EST SF 10 MIN: CPT | Performed by: NURSE PRACTITIONER

## 2025-01-16 PROCEDURE — G8421 BMI NOT CALCULATED: HCPCS | Performed by: NURSE PRACTITIONER

## 2025-01-16 PROCEDURE — 1123F ACP DISCUSS/DSCN MKR DOCD: CPT | Performed by: NURSE PRACTITIONER

## 2025-01-16 PROCEDURE — 1159F MED LIST DOCD IN RCRD: CPT | Performed by: NURSE PRACTITIONER

## 2025-04-17 ENCOUNTER — OFFICE VISIT (OUTPATIENT)
Dept: UROGYNECOLOGY | Age: 89
End: 2025-04-17

## 2025-04-17 VITALS
SYSTOLIC BLOOD PRESSURE: 160 MMHG | RESPIRATION RATE: 18 BRPM | OXYGEN SATURATION: 93 % | DIASTOLIC BLOOD PRESSURE: 69 MMHG | TEMPERATURE: 97.3 F

## 2025-04-17 DIAGNOSIS — N81.11 CYSTOCELE, MIDLINE: Primary | ICD-10-CM

## 2025-04-17 DIAGNOSIS — N89.8 VAGINAL DISCHARGE: ICD-10-CM

## 2025-04-17 DIAGNOSIS — N99.3 PROLAPSE OF VAGINAL VAULT AFTER HYSTERECTOMY: ICD-10-CM

## 2025-04-17 NOTE — PROGRESS NOTES
Date: 4/17/2025     HPI:     Name: Kristi Jacobs  YOB: 1935    CC: Kristi Jacobs returns for a pessary check today.  HPI: She reports no bleeding, no discharge, no pain, no discomfot. She is not removing the device herself at home.   Do you have vaginal discharge?:   How long have you had this problem?:     Please rate the severity of your problem: mild  Anything make it better? Reports the pessary helps     Ob/Gyn History:    OB History   No obstetric history on file.      Past Medical History:   Past Medical History:   Diagnosis Date    Apnea 02/15/2022    Arthritis of both knees     Atrial tachycardia     Cellulitis     Chest pain     Chronic midline thoracic back pain     Compression fracture of thoracic vertebra, closed, initial encounter ([facility])     Heart palpitations     HTN (hypertension)     Hyperglyceridemia     Hyperlipidemia     Left cataract     Leukorrhea     Macular degeneration     Midline cystocele     Midline low back pain without sciatica     MICHI (obstructive sleep apnea)     Osteoporosis     Raynaud phenomenon     Snoring     Subacute on chronic vaginitis     Vaginal atrophy     Vaginal discharge     Vaginal discharge 02/15/2022    Vulvovaginitis      Past Surgical History:   Past Surgical History:   Procedure Laterality Date    APPENDECTOMY      BUNIONECTOMY      EYE SURGERY      TONSILLECTOMY      VARICOSE VEIN SURGERY      WISDOM TOOTH EXTRACTION       Current Medications:  Current Outpatient Medications   Medication Sig Dispense Refill    dilTIAZem (TIAZAC) 300 MG extended release capsule       trimethoprim-polymyxin b (POLYTRIM) 52226-7.1 UNIT/ML-% ophthalmic solution Apply 1 drop to eye      vitamin D (ERGOCALCIFEROL) 1.25 MG (41953 UT) CAPS capsule       Multiple Vitamins-Minerals (PRESERVISION AREDS 2 PO) Take by mouth      dilTIAZem (CARDIZEM CD) 240 MG extended release capsule       ezetimibe-simvastatin (VYTORIN) 10-40 MG per tablet Take 1 tablet by mouth nightly

## 2025-05-13 ENCOUNTER — PROCEDURE VISIT (OUTPATIENT)
Dept: SURGERY | Age: 89
End: 2025-05-13
Payer: MEDICARE

## 2025-05-13 VITALS — SYSTOLIC BLOOD PRESSURE: 123 MMHG | DIASTOLIC BLOOD PRESSURE: 80 MMHG | HEART RATE: 72 BPM

## 2025-05-13 DIAGNOSIS — C44.722 SCC (SQUAMOUS CELL CARCINOMA), LEG, RIGHT: Primary | ICD-10-CM

## 2025-05-13 DIAGNOSIS — C44.722 SCC (SQUAMOUS CELL CARCINOMA), LEG, RIGHT: ICD-10-CM

## 2025-05-13 PROCEDURE — 17261 DSTRJ MAL LES T/A/L .6-1.0CM: CPT | Performed by: DERMATOLOGY

## 2025-05-13 PROCEDURE — 17313 MOHS 1 STAGE T/A/L: CPT | Performed by: DERMATOLOGY

## 2025-05-13 PROCEDURE — 17314 MOHS ADDL STAGE T/A/L: CPT | Performed by: DERMATOLOGY

## 2025-05-13 RX ORDER — DOXYCYCLINE HYCLATE 100 MG
100 TABLET ORAL 2 TIMES DAILY
Qty: 14 TABLET | Refills: 0 | Status: SHIPPED | OUTPATIENT
Start: 2025-05-13 | End: 2025-05-20

## 2025-05-13 NOTE — PROGRESS NOTES
ELECTROSURGERY AND CURETTAGE OPERATIVE PROCEDURE NOTE      PREOPERATIVE DIAGNOSIS: Squamous Cell Carcinoma    POSTOPERATIVE DIAGNOSIS: SAME.     OPERATIVE PROCEDURE:   ELECTRODESICCATION AND CURETTAGE    LOCATION: Right leg superior    SIZE OF LESION:10x10 MM    REFERRING PROVIDER: Rene Mortensen M.D.     ANESTHESIA:  5 CC XYLOCAINE 1% WITH EPINEPHRINE 1:100,000 BUFFERED    DURATION OF PROCEDURE: 20 MINUTES    POSTOPERATIVE OBSERVATION: 30 MINUTES    EBL: MINIMAL.     SPECIMENS:  None    COMPLICATIONS: None    DESCRIPTION OF PROCEDURE: The patient was given a mirror and the biopsy site was identified, marked with surgical marking pen, and verified with the patient. Written consent was obtained. There was a time out for person and procedure verification. The operative site was cleansed with Chlorhexidine gluconate 2% with isopropylalcohol 70%, then cleaned off, dried, and draped. The lesion was curetted in several directions followed by electrocoagulation.  This was repeated 3 times.    The area was cleansed with sterile saline.  Petrolatum ointment was applied to the wound and a small pressure dressing was applied.  Detailed post-care instructions were verbally reviewed with the patient and a handout given.    The patient tolerated the procedure well without any complications.  The patient left the office in good condition.      The patient will return for wound check as needed.     [facility] Criteria Stage T1:  High risk features identified: No  Size >/=2cm: No  PNI: No  Poorly differentiated: No  Depth beyond subcutaneous fat: No    AJCC 8th Edition  > 4 cm no  Perineural invasion no  Deep invasion of tumor beyond the subcutaneous fat no  > 6 mm DOI and bone invasion no      Any further testing ordered or indicated at this time?; I.e [place] testing, tissue sent for permanent section staining, CT scan or referral to Radiation oncology: No

## 2025-05-13 NOTE — PROGRESS NOTES
MOHS PROCEDURE NOTE    PHYSICIAN:  Nayla Nguyen MD, Who operated in two distinct and integrated capacities as the surgeon removing the tissue and as the pathologist examining the tissue.    ASSISTANT: Irineo Funes RN and Sherry Trejo RN      REFERRING PROVIDER:  Rene Mortensen M.D.     PREOPERATIVE DIAGNOSIS:  Squamous Cell Carcinoma      SPECIFIC MOHS INDICATIONS:  size, location, and need for tissue conservation    AUC SCORIN/9    POSTOPERATIVE DIAGNOSIS: SAME    LOCATION: Right leg inferior    OPERATIVE PROCEDURE:  MOHS MICROGRAPHIC SURGERY    RECONSTRUCTION OF DEFECT: Second Intention Wound Healing    PREOPERATIVE SIZE: 30x22 MM    DEFECT SIZE: 42x30 MM    LENGTH OF REPAIRED WOUND/SIZE OF FLAP/SIZE OF GRAFT:  N/A    ANESTHESIA: 12 mL 1% lidocaine with epinephrine 1:100,000 buffered.     EBL:  MINIMAL    DURATION OF PROCEDURE:  1.5 HOURS    POSTOPERATIVE OBSERVATION: 0.5 HOUR    SPECIMENS:  SEE MOHS MAP    COMPLICATIONS:  NONE    DESCRIPTION OF PROCEDURE:  The patient was given a mirror, as appropriate, and the biopsy site was identified, marked with a surgical marking pen, and verified by the patient.   Options for treatment were discussed and the patient was informed that Mohs surgery was the selected treatment based on its lower recurrence rate, given the features listed above, as compared to other treatment modalities such as excision, radiation, or curettage, and agreed with this treatment plan.  Risks and benefits including bruising, swelling, bleeding, infection, nerve injury, recurrence, and scarring were discussed with the patient prior to the procedure and a written consent detailing these and other risks was reviewed with the patient and signed.    There was a time out for person and procedure verification.  The surgical site was prepped with an antiseptic solution.  Application of an antiseptic solution was repeated before each surgical stage.      Stage I:  The clinically-apparent tumor

## 2025-05-13 NOTE — PROGRESS NOTES
PRE-PROCEDURE SCREENING    Pacemaker/ICD: No  Difficulty with numbing in the past: No  Local Anesthesia Reaction/passing out: No  Latex or adhesive allergy:  No  Any history of reaction to suture or skin glue:  no  Bleeding/Clotting Disorders: No  Anticoagulant Therapy: No  Joint prosthesis: Yes  Artificial Heart Valve: Yes - LT Knee 9/23/2024   Stroke or Seizures: No  Organ Transplant or Lymphoma: No  Immunosuppression: No  Respiratory Problems: No

## 2025-05-13 NOTE — PATIENT INSTRUCTIONS
[facility] Office Hours:    Monday-Thursday  7:30 AM-4:30 PM    Friday  9:00 AM-1:00 PM     DAILY WOUND CARE-SECOND INTENTION - LEG    1.  Keep the area absolutely dry for 48 hours.          -After 48 hours you may remove the bandage and shower.  2.  Remove the bandage and clean the wound with mild soap and water. Try to clean off crust and debris.            It is important not to allow a scab to form as scabs slow down the healing process.   3. Soak with a diluted vinegar solution of 1 part white table vinegar to 4 parts water for 5 minutes daily to help disinfect the area.    4.  Apply a layer of Vaseline (or Bacitracin if your doctor recommends) to the wound area only.  5.  Cut a piece of Non-stick dressing, such as Telfa, to fit just over the wound and secure it with paper tape. If the wound is small you may use a Band-Aid  6.  Elevate your legs whenever you are in a seated position for more than 15 minutes.  Wear compression stockings or support hose if possible to reduce swelling.    You should continue daily wound care and keep a bandage on until new skin has grown over the entire wound    Bleeding: If bleeding occurs, DO NOT remove the bandage. Put firm pressure on the area with gauze for 20 minutes without peeking. If the bleeding continues, apply pressure for 20 minutes more.  If the bleeding does not stop after you apply pressure, call us right away. If you can’t call, go to the nearest emergency room or urgent care facility.    POST-OPERATIVE INSTRUCTIONS     1. Activity: Do not lift anything heavier than a gallon of milk for 1 week. Also, avoid strenuous activity such as running, power walking or contact sports.  2.  Eating and drinking: Do not drink alcohol for 48 hours after your procedure. Alcohol increases the chances of bleeding.  3.  Medicines:  -If you have discomfort, take acetaminophen (Tylenol or Extra Strength Tylenol). Follow the instructions and warning on the

## 2025-05-14 ENCOUNTER — TELEPHONE (OUTPATIENT)
Dept: SURGERY | Age: 89
End: 2025-05-14

## 2025-05-14 NOTE — TELEPHONE ENCOUNTER
The patient was in the office on 05/13/2025 for Mohs located on the RT leg inferior with 2nd intention wound healing  repair., as well as RT leg superior ED & C.  The patient tolerated the procedure well and left the office in good condition.    Pain level on post-operative day 1:  pain level w/walking - 6 - sitting minimal 1/2.  PT advised she has been taking Tylenol and this is helping a great deal.  She will continue through tomorrow.     Any bleeding episode that required pressure to be held, bandage change or a call to the office or MD?  no     Any other issues?:  yes - some swelling, but has decreased a great deal by today.     A post-operative telephone call was placed at 11:15a, 05/14/2025,  in order to check on the patient's recovery process.  The patient reported doing well and had no complaints other than those listed above, if any.  All of the patient's questions were answered. Advised to call w/any questions/concerns.

## 2025-05-27 ENCOUNTER — PROCEDURE VISIT (OUTPATIENT)
Dept: SURGERY | Age: 89
End: 2025-05-27
Payer: MEDICARE

## 2025-05-27 VITALS — SYSTOLIC BLOOD PRESSURE: 134 MMHG | DIASTOLIC BLOOD PRESSURE: 74 MMHG | HEART RATE: 66 BPM

## 2025-05-27 DIAGNOSIS — Z51.89 VISIT FOR WOUND CHECK: ICD-10-CM

## 2025-05-27 DIAGNOSIS — C44.729 SQUAMOUS CELL CARCINOMA OF LEFT THIGH: Primary | ICD-10-CM

## 2025-05-27 PROCEDURE — 1123F ACP DISCUSS/DSCN MKR DOCD: CPT | Performed by: DERMATOLOGY

## 2025-05-27 PROCEDURE — G8427 DOCREV CUR MEDS BY ELIG CLIN: HCPCS | Performed by: DERMATOLOGY

## 2025-05-27 PROCEDURE — 1159F MED LIST DOCD IN RCRD: CPT | Performed by: DERMATOLOGY

## 2025-05-27 PROCEDURE — 12032 INTMD RPR S/A/T/EXT 2.6-7.5: CPT | Performed by: DERMATOLOGY

## 2025-05-27 PROCEDURE — 1036F TOBACCO NON-USER: CPT | Performed by: DERMATOLOGY

## 2025-05-27 PROCEDURE — 1090F PRES/ABSN URINE INCON ASSESS: CPT | Performed by: DERMATOLOGY

## 2025-05-27 PROCEDURE — 99213 OFFICE O/P EST LOW 20 MIN: CPT | Performed by: DERMATOLOGY

## 2025-05-27 PROCEDURE — 11603 EXC TR-EXT MAL+MARG 2.1-3 CM: CPT | Performed by: DERMATOLOGY

## 2025-05-27 PROCEDURE — G8421 BMI NOT CALCULATED: HCPCS | Performed by: DERMATOLOGY

## 2025-05-27 NOTE — PATIENT INSTRUCTIONS
[facility] Office Hours:    Monday-Thursday  7:30 AM-4:30 PM    Friday  9:00 AM-1:00 PM       Care for right lower leg - continue wound care as you have been doing but begin to use a moist Qtip and roll it around in the wound base to remove the fibrin. follow up with Dr. Nguyen in 2 weeks for a wound check.      Care for your left thigh:  POST-OPERATIVE CARE FOR STITCHES  Bandage change after 48 hours    CARING FOR YOUR SURGICAL SITE  The bandage should remain on and completely dry for 48 hours. Do NOT get the bandage wet.  After the first 48 hours, gently remove the remaining part of the bandage. It can be helpful to moisten the bandage edges in the shower. Steri strips may still be on the wound. It is ok, they will fall off slowly with the daily bandage changes.  Gently clean the wound daily with mild soap and water. Try to clean off crust and debris.   Dry (pat) the area with a clean Q-tip or gauze.   Apply a layer of Vaseline/ Aquaphor (or Bacitracin if your doctor recommends) to the wound area only.  Cut a piece of Telfa (or any non-stick dressing) to fit just over the wound and secure it with paper tape. If the wound is small you may use a Band- Aid. Keep area covered for a total of 2 week(s).  If the dressing comes off or if you have questions, or concerns about the dressing, please call the office for instructions!    POST OPERATIVE INSTRUCTIONS    Activity: Do not lift anything heavier than a gallon of milk for 1 week. Also, avoid strenuous activity such as running, power walking or contact sports.  Eating and drinking: Do not drink alcohol for 48 hours after your procedure. Alcohol increases the chances of bleeding.  Medicines   -If you have discomfort, take Acetaminophen (Tylenol or Extra Strength Tylenol). Follow the instructions and warning on the bottle.  -If your doctor has prescribed you an Aspirin daily, please keep taking it. Do not take extra Aspirin or medicines containing

## 2025-05-27 NOTE — PROGRESS NOTES
EXCISION OPERATIVE PROCEDURE NOTE    SURGEON: Nayla Nguyen MD    ASSISTANT:  Mike Burkett RN    REFERRING PROVIDER:  Rene Mortensen M.D.    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     OPERATIVE PROCEDURE: EXCISION FOR CURATIVE INTENT    RECONSTRUCTION OF DEFECT: Intermediate layered closure    LOCATION: Left Upper Thigh     SIZE OF LESION: 14 x 13 MM     SIZE OF LESION PLUS CIRCUMFERENTIAL MARGIN: 24 x 23 MM     FINAL REPAIR LENGTH:  72 MM    ANESTHESIA:18 CC XYLOCAINE 1% WITH EPINEPHRINE 1:100,000, BUFFERED.      DURATION OF PROCEDURE: 15 MINUTES     POSTOPERATIVE OBSERVATION: 30 MINUTES     EBL: MINIMAL.     SPECIMENS: 1    COMPLICATIONS: NONE     DESCRIPTION OF PROCEDURE: The patient was given a mirror and the biopsy site was identified, marked with surgical marking pen, and verified with the patient. Written consent was obtained. There was a time out for person and procedure verification. The operative site was cleansed with Chlorhexidine gluconate 4% solution, then cleaned off, dried, and draped sterilely. The lesion was excised in a fusiform design down to subcutaneous fat with 5 mm clinical margins. Hemostasis was achieved with electrosurgery.     DEFECT MANAGEMENT:  Various closure modalities were discussed with the patient, and it was decided that an Intermediate layered closure would best preserve normal anatomic and functional relationships. Additional risk of wound dehiscence was discussed.     The final incision lines were placed with respect for the patient's natural skin tension lines in a linear configuration to avoid functional and aesthetic distortion of adjacent free margins. Following minimal undermining, meticulous hemostasis was obtained with spot monopolar electrocoagulation.  Subcutaneous dead space/fascia and dermis were closed using 4-0 Vicryl buried subcutaneous interrupted suture and the epidermis was

## 2025-05-27 NOTE — PROGRESS NOTES
S: The patient is here for wound check s/p Mohs surgery on the right lower leg with second intent wound healing, 2 week(s) ago.  The patient has no complaints. Some pain but tolerable  O:  The wound has retained gelfoam and minimal fibrin.  No erythema/purulence/pain.      A/P:  Chronic problem: is not at treatment goal:  open wound of the right lower leg s/p Mohs surgery.  Status:  The wound is healing well by second intention.  No s/sx infection/bleeding. Gelfoam and fibrin debrided    The area was cleansed with a gentle cleanser, a small amount of Aquaphor and a non-stick dressing applied.   Detailed second intention wound care instructions reviewed with the patient including daily gentle cleansing with mild cleanser such as cetaphil, application of topical petrolatum or aquaphor and wound coverage. Reminder to remove excessive fibrin as necessary, best after cleansing when fibrin is less adherent.  Pt education on how to perform this.  Healing time discussed.  The patient is scheduled for f/u wound check in 2 week(s).    OTC Meds:  aquaphor  Prescription Meds:  no  Co-morbid conditions affecting healing (I.e. tobacco, diabetes, pvd, peripheral edema, immunosuppression):  no

## 2025-05-27 NOTE — PROGRESS NOTES
PRE-PROCEDURE SCREENING    Pacemaker/ICD: No  Difficulty with numbing in the past: No  Local Anesthesia Reaction/passing out: No  Latex or adhesive allergy:  No  Any history of reaction to suture or skin glue:  no  Bleeding/Clotting Disorders: No  Anticoagulant Therapy: No  Joint prosthesis: Yes, Left TKR 2024  Artificial Heart Valve: No  Stroke or Seizures: No  Organ Transplant or Lymphoma: No  Immunosuppression: No  Respiratory Problems: No

## 2025-05-28 ENCOUNTER — TELEPHONE (OUTPATIENT)
Dept: SURGERY | Age: 89
End: 2025-05-28

## 2025-05-28 NOTE — TELEPHONE ENCOUNTER
The patient was in the office on 05/27/2025 for Excision located on the LT upper thigh with ILC repair.  The patient tolerated the procedure well and left the office in good condition.    A post-operative telephone call was placed at 9:34a, 05/28/2025, in order to check on the patient's recovery process.  The patient was not able to be reached and a phone message was left. L/M to call w/any questions/concerns.

## 2025-05-30 LAB — DERMATOLOGY PATHOLOGY REPORT: ABNORMAL

## 2025-06-02 ENCOUNTER — TELEPHONE (OUTPATIENT)
Dept: SURGERY | Age: 89
End: 2025-06-02

## 2025-06-02 ENCOUNTER — RESULTS FOLLOW-UP (OUTPATIENT)
Dept: SURGERY | Age: 89
End: 2025-06-02

## 2025-06-02 NOTE — TELEPHONE ENCOUNTER
The patient was unavailable and a voicemail was left with the following information:    Date of excision: 5/27/2025  Site of excision: Left upper thigh  Result: No residual squamous cell carcinoma    Plan: No further treatment

## 2025-06-10 ENCOUNTER — OFFICE VISIT (OUTPATIENT)
Dept: SURGERY | Age: 89
End: 2025-06-10
Payer: MEDICARE

## 2025-06-10 DIAGNOSIS — Z48.02 VISIT FOR SUTURE REMOVAL: Primary | ICD-10-CM

## 2025-06-10 PROCEDURE — G8427 DOCREV CUR MEDS BY ELIG CLIN: HCPCS | Performed by: DERMATOLOGY

## 2025-06-10 PROCEDURE — 99213 OFFICE O/P EST LOW 20 MIN: CPT | Performed by: DERMATOLOGY

## 2025-06-10 PROCEDURE — 1123F ACP DISCUSS/DSCN MKR DOCD: CPT | Performed by: DERMATOLOGY

## 2025-06-10 PROCEDURE — 1036F TOBACCO NON-USER: CPT | Performed by: DERMATOLOGY

## 2025-06-10 PROCEDURE — 1090F PRES/ABSN URINE INCON ASSESS: CPT | Performed by: DERMATOLOGY

## 2025-06-10 PROCEDURE — G8421 BMI NOT CALCULATED: HCPCS | Performed by: DERMATOLOGY

## 2025-06-10 PROCEDURE — 1159F MED LIST DOCD IN RCRD: CPT | Performed by: DERMATOLOGY

## 2025-06-10 NOTE — PROGRESS NOTES
Patient is here for suture removal s/p excision of a squamous cell carcinoma, keratoacanthoma type on the left upper thigh, on 5/27/2025. The site appears well-healed without signs of infection (redness, pain or discharge). The sutures were removed. Steri-strips were not applied.  Wound care and activity instructions given.     Pathology results were reviewed with the patient.  Further treatment is not indicated given the lesion was completely excised.    F/u prn.          S: The patient is here for wound check s/p Mohs surgery on the right leg inferior with second intent wound healing, 4 week(s) ago.  The patient has no complaints today.  O:  The wound has moderate fibrin.  No erythema/purulence/pain.      A/P:  Chronic problem: is not at treatment goal:  open wound of the right leg inferior s/p Mohs surgery.  Status:  The wound is healing well by second intention.  No s/sx infection/bleeding. Fibrin debrided with forceps.    The area was cleansed with a gentle cleanser, a small amount of Aquaphor and a non-stick dressing applied.   Detailed second intention wound care instructions reviewed with the patient including daily gentle cleansing with mild cleanser such as cetaphil, application of topical petrolatum or aquaphor and wound coverage. Reminder to remove excessive fibrin as necessary, best after cleansing when fibrin is less adherent.  Pt education on how to perform this.  Healing time discussed.  The patient is scheduled for f/u wound check in 4 week(s).    OTC Meds:  Vaseline/Aquaphor  Prescription Meds:  none  Co-morbid conditions affecting healing (I.e. tobacco, diabetes, pvd, peripheral edema, immunosuppression):  no            Electronically signed by Sherry Trejo RN on 6/10/2025 at 3:27 PM    Sherry KINSEY RN, am scribing for and in the presence of Dr.Emily Nguyen,6/10/2025.      Nayla KINSEY MD,  personally performed the services described in this documentation as scribed by Sherry

## 2025-06-10 NOTE — PATIENT INSTRUCTIONS
[facility] Office Hours:    Monday-Thursday  7:30 AM-4:30 PM    Friday  9:00 AM-1:00 PM

## 2025-07-08 ENCOUNTER — OFFICE VISIT (OUTPATIENT)
Dept: SURGERY | Age: 89
End: 2025-07-08
Payer: MEDICARE

## 2025-07-08 DIAGNOSIS — Z51.89 VISIT FOR WOUND CHECK: Primary | ICD-10-CM

## 2025-07-08 PROCEDURE — 99213 OFFICE O/P EST LOW 20 MIN: CPT | Performed by: DERMATOLOGY

## 2025-07-08 PROCEDURE — 1123F ACP DISCUSS/DSCN MKR DOCD: CPT | Performed by: DERMATOLOGY

## 2025-07-08 PROCEDURE — 1036F TOBACCO NON-USER: CPT | Performed by: DERMATOLOGY

## 2025-07-08 PROCEDURE — G8427 DOCREV CUR MEDS BY ELIG CLIN: HCPCS | Performed by: DERMATOLOGY

## 2025-07-08 PROCEDURE — 1090F PRES/ABSN URINE INCON ASSESS: CPT | Performed by: DERMATOLOGY

## 2025-07-08 PROCEDURE — 1159F MED LIST DOCD IN RCRD: CPT | Performed by: DERMATOLOGY

## 2025-07-08 PROCEDURE — G8421 BMI NOT CALCULATED: HCPCS | Performed by: DERMATOLOGY

## 2025-07-08 NOTE — PROGRESS NOTES
S: The patient is here for wound check s/p Mohs surgery on the right leg inferior with second intent wound healing, 8 week(s) ago.  The patient has no complaints.  O:  The wound has moderate fibrin.  No erythema/purulence/pain.      A/P:  Chronic problem: is not at treatment goal:  open wound of the right leg inferior s/p Mohs surgery.  Status:  The wound is healing well by second intention.  No s/sx infection/bleeding. Fibrin debrided with forceps    The area was cleansed with a gentle cleanser, a small amount of Aquaphor and a non-stick dressing applied.   Detailed second intention wound care instructions reviewed with the patient including daily gentle cleansing with mild cleanser such as cetaphil, application of topical petrolatum or aquaphor and wound coverage. Reminder to remove excessive fibrin as necessary, best after cleansing when fibrin is less adherent.  Pt education on how to perform this.  Healing time discussed.  The patient is scheduled for f/u wound check in 4 week(s).    OTC Meds:  cont aquaphor and keep covered  Prescription Meds:  no  Co-morbid conditions affecting healing (I.e. tobacco, diabetes, pvd, peripheral edema, immunosuppression):  no

## 2025-07-08 NOTE — PATIENT INSTRUCTIONS
[facility] Office Hours:    Monday-Thursday  7:30 AM-4:30 PM    Friday  9:00 AM-1:00 PM     PT to continue daily wound care - vaseline/aquaphor and keeping covered until fully healed.

## 2025-07-17 ENCOUNTER — OFFICE VISIT (OUTPATIENT)
Dept: UROGYNECOLOGY | Age: 89
End: 2025-07-17

## 2025-07-17 VITALS
RESPIRATION RATE: 18 BRPM | TEMPERATURE: 97.8 F | SYSTOLIC BLOOD PRESSURE: 184 MMHG | DIASTOLIC BLOOD PRESSURE: 64 MMHG | OXYGEN SATURATION: 100 % | HEART RATE: 56 BPM

## 2025-07-17 DIAGNOSIS — N76.5 VAGINAL ULCER: ICD-10-CM

## 2025-07-17 DIAGNOSIS — N81.11 CYSTOCELE, MIDLINE: Primary | ICD-10-CM

## 2025-07-17 DIAGNOSIS — N99.3 PROLAPSE OF VAGINAL VAULT AFTER HYSTERECTOMY: ICD-10-CM

## 2025-07-17 DIAGNOSIS — N95.2 VAGINAL ATROPHY: ICD-10-CM

## 2025-07-17 RX ORDER — HYDROCHLOROTHIAZIDE 12.5 MG/1
12.5 TABLET ORAL DAILY
COMMUNITY
Start: 2025-04-15

## 2025-07-17 NOTE — PROGRESS NOTES
Date: 7/17/2025     HPI:     Name: Kristi Jacobs  YOB: 1935    CC: Kristi Jacobs returns for a pessary check today.    HPI:   She reports no bleeding, no discharge, no pain, no discomfot. She is not removing the device herself at home.   Do you have vaginal discharge?: No    How long have you had this problem?:   years  Please rate the severity of your problem:   Anything make it better? Pessary       Ob/Gyn History:    OB History   No obstetric history on file.      Past Medical History:   Past Medical History:   Diagnosis Date    Apnea 02/15/2022    Arthritis of both knees     Atrial tachycardia     Cellulitis     Chest pain     Chronic midline thoracic back pain     Compression fracture of thoracic vertebra, closed, initial encounter ([facility])     Heart palpitations     HTN (hypertension)     Hyperglyceridemia     Hyperlipidemia     Left cataract     Leukorrhea     Macular degeneration     Midline cystocele     Midline low back pain without sciatica     MICHI (obstructive sleep apnea)     Osteoporosis     Raynaud phenomenon     Skin cancer 05/13/2025    RT leg superior - SCC well diff, RT leg inferior - SCC KA type    Snoring     Subacute on chronic vaginitis     Vaginal atrophy     Vaginal discharge     Vaginal discharge 02/15/2022    Vulvovaginitis      Past Surgical History:   Past Surgical History:   Procedure Laterality Date    APPENDECTOMY      BUNIONECTOMY      EYE SURGERY      MOHS SURGERY Right 05/13/2025    RT leg superior - SCC well diff (ED & C - RT leg inferior SCC - KA type    TONSILLECTOMY      VARICOSE VEIN SURGERY      WISDOM TOOTH EXTRACTION       Current Medications:  Current Outpatient Medications   Medication Sig Dispense Refill    hydroCHLOROthiazide 12.5 MG tablet Take 1 tablet by mouth daily      dilTIAZem (TIAZAC) 300 MG extended release capsule       olopatadine (PATADAY) 0.2 % SOLN ophthalmic solution Apply 1 drop to eye daily      trimethoprim-polymyxin b (POLYTRIM)

## 2025-08-05 ENCOUNTER — OFFICE VISIT (OUTPATIENT)
Dept: SURGERY | Age: 89
End: 2025-08-05
Payer: MEDICARE

## 2025-08-05 DIAGNOSIS — Z51.89 VISIT FOR WOUND CHECK: Primary | ICD-10-CM

## 2025-08-05 PROCEDURE — 99213 OFFICE O/P EST LOW 20 MIN: CPT | Performed by: DERMATOLOGY

## 2025-08-05 PROCEDURE — 1123F ACP DISCUSS/DSCN MKR DOCD: CPT | Performed by: DERMATOLOGY

## 2025-08-05 PROCEDURE — 1090F PRES/ABSN URINE INCON ASSESS: CPT | Performed by: DERMATOLOGY

## 2025-08-05 PROCEDURE — 1036F TOBACCO NON-USER: CPT | Performed by: DERMATOLOGY

## 2025-08-05 PROCEDURE — G8421 BMI NOT CALCULATED: HCPCS | Performed by: DERMATOLOGY

## 2025-08-05 PROCEDURE — G8427 DOCREV CUR MEDS BY ELIG CLIN: HCPCS | Performed by: DERMATOLOGY

## 2025-08-05 PROCEDURE — 1159F MED LIST DOCD IN RCRD: CPT | Performed by: DERMATOLOGY

## 2025-08-19 ENCOUNTER — OFFICE VISIT (OUTPATIENT)
Dept: UROGYNECOLOGY | Age: 89
End: 2025-08-19
Payer: MEDICARE

## 2025-08-19 VITALS
SYSTOLIC BLOOD PRESSURE: 155 MMHG | HEART RATE: 61 BPM | DIASTOLIC BLOOD PRESSURE: 53 MMHG | TEMPERATURE: 97.5 F | OXYGEN SATURATION: 98 % | RESPIRATION RATE: 16 BRPM

## 2025-08-19 DIAGNOSIS — N81.11 CYSTOCELE, MIDLINE: Primary | ICD-10-CM

## 2025-08-19 DIAGNOSIS — N76.5 VAGINAL ULCER: ICD-10-CM

## 2025-08-19 DIAGNOSIS — N95.2 VAGINAL ATROPHY: ICD-10-CM

## 2025-08-19 DIAGNOSIS — N99.3 PROLAPSE OF VAGINAL VAULT AFTER HYSTERECTOMY: ICD-10-CM

## 2025-08-19 PROCEDURE — 1036F TOBACCO NON-USER: CPT | Performed by: NURSE PRACTITIONER

## 2025-08-19 PROCEDURE — 1159F MED LIST DOCD IN RCRD: CPT | Performed by: NURSE PRACTITIONER

## 2025-08-19 PROCEDURE — 1160F RVW MEDS BY RX/DR IN RCRD: CPT | Performed by: NURSE PRACTITIONER

## 2025-08-19 PROCEDURE — G8427 DOCREV CUR MEDS BY ELIG CLIN: HCPCS | Performed by: NURSE PRACTITIONER

## 2025-08-19 PROCEDURE — 99212 OFFICE O/P EST SF 10 MIN: CPT | Performed by: NURSE PRACTITIONER

## 2025-08-19 PROCEDURE — A4561 PESSARY RUBBER, ANY TYPE: HCPCS | Performed by: NURSE PRACTITIONER

## 2025-08-19 PROCEDURE — 1090F PRES/ABSN URINE INCON ASSESS: CPT | Performed by: NURSE PRACTITIONER

## 2025-08-19 PROCEDURE — 1123F ACP DISCUSS/DSCN MKR DOCD: CPT | Performed by: NURSE PRACTITIONER

## 2025-08-19 PROCEDURE — G8421 BMI NOT CALCULATED: HCPCS | Performed by: NURSE PRACTITIONER

## 2025-09-02 ENCOUNTER — OFFICE VISIT (OUTPATIENT)
Dept: SURGERY | Age: 89
End: 2025-09-02
Payer: MEDICARE

## 2025-09-02 DIAGNOSIS — Z51.89 VISIT FOR WOUND CHECK: Primary | ICD-10-CM

## 2025-09-02 PROCEDURE — 99213 OFFICE O/P EST LOW 20 MIN: CPT | Performed by: DERMATOLOGY

## 2025-09-02 PROCEDURE — 1123F ACP DISCUSS/DSCN MKR DOCD: CPT | Performed by: DERMATOLOGY

## 2025-09-02 PROCEDURE — 1090F PRES/ABSN URINE INCON ASSESS: CPT | Performed by: DERMATOLOGY

## 2025-09-02 PROCEDURE — 1036F TOBACCO NON-USER: CPT | Performed by: DERMATOLOGY

## 2025-09-02 PROCEDURE — G8421 BMI NOT CALCULATED: HCPCS | Performed by: DERMATOLOGY

## 2025-09-02 PROCEDURE — G8428 CUR MEDS NOT DOCUMENT: HCPCS | Performed by: DERMATOLOGY
